# Patient Record
Sex: FEMALE | Race: WHITE | Employment: OTHER | URBAN - METROPOLITAN AREA
[De-identification: names, ages, dates, MRNs, and addresses within clinical notes are randomized per-mention and may not be internally consistent; named-entity substitution may affect disease eponyms.]

---

## 2020-02-25 ENCOUNTER — ANESTHESIA EVENT (OUTPATIENT)
Dept: OPERATING ROOM | Age: 55
DRG: 025 | End: 2020-02-25
Payer: COMMERCIAL

## 2020-02-25 RX ORDER — ONDANSETRON 4 MG/1
TABLET, FILM COATED ORAL
COMMUNITY
Start: 2020-02-21 | End: 2020-06-24 | Stop reason: ALTCHOICE

## 2020-02-25 RX ORDER — LEVETIRACETAM 500 MG/1
1000 TABLET ORAL 2 TIMES DAILY
COMMUNITY
Start: 2020-02-21

## 2020-02-25 RX ORDER — VENLAFAXINE 75 MG/1
75 TABLET ORAL DAILY
COMMUNITY
Start: 2017-07-21 | End: 2020-06-24 | Stop reason: DRUGHIGH

## 2020-02-25 RX ORDER — BENAZEPRIL/HYDROCHLOROTHIAZIDE 20 MG-25MG
1 TABLET ORAL DAILY
COMMUNITY
Start: 2017-06-12 | End: 2021-07-27

## 2020-02-25 RX ORDER — HYDROCODONE BITARTRATE AND ACETAMINOPHEN 5; 325 MG/1; MG/1
TABLET ORAL
Status: ON HOLD | COMMUNITY
Start: 2020-02-21 | End: 2020-02-28 | Stop reason: HOSPADM

## 2020-02-25 NOTE — PROGRESS NOTES
[facility] PRE-SURGICAL TESTING INSTRUCTIONS                              PRIOR TO PROCEDURE DATE:  1. Please follow any guidelines/instructions prior to your procedure as advised by your surgeon. 2. Arrange for someone to drive you home and be with you for the first 24 hours after discharge for your safety after your procedure for which you received sedation. Ensure it is someone we can share information with regarding your discharge. 3. You must contact your surgeon for instructions IF:   You are taking any blood thinners, aspirin, anti-inflammatory or vitamin E.   There is a change in your physical condition such as a cold, fever, rash, cuts, sores or any other infection, especially near your surgical site. 4. Do not drink alcohol the day before or day of your procedure. 5. A Pre-op History and Physical for surgery MUST be completed by your Physician or Urgent Care within 30 days of your procedure date. Please bring a copy with you on the day of your procedure and along with any other testing performed. THE DAY OF YOUR PROCEDURE:  1. Follow instructions for ARRIVAL TIME as DIRECTED BY YOUR SURGEON. I    2. Enter the MAIN entrance from [place] and follow the signs to the free [employer] or [email or web address] parking (offered free of charge 6am-5pm). 3. Enter the Main Entrance of the hospital (do not enter from the lower level of the parking garage). Upon entrance, check in with the  at the main desk on your left. If no one is available at the desk, proceed into the [facility] Waiting Room and go through the door directly into the [facility]. There is a Check-in desk ACROSS from Room 5 (marked with a sign hanging from the ceiling). The phone number for the surgery center is [phone]. 4. Please call [phone] option #2 option #2 if you have not been preregistered yet. On the day of your procedure bring your insurance card and photo ID.  You will be room.    13. If you have a Living Will or Durable Power of , please bring a copy on the day of your procedure. 15. With your permission, one family member may accompany you while you are being prepared for surgery. Once you are ready, additional family members may join you. HOW WE KEEP YOU SAFE and WORK TO PREVENT SURGICAL SITE INFECTIONS:  1. Health care workers should always check your ID bracelet to verify your name and birth date. You will be asked many times to state your name, date of birth, and allergies. 2. Health care workers should always clean their hands with soap or alcohol gel before providing care to you. It is okay to ask anyone if they cleaned their hands before they touch you. 3. You will be actively involved in verifying the type of procedure you are having and ensuring the correct surgical site. This will be confirmed multiple times prior to your procedure. Do NOT yann your surgery site UNLESS instructed to by your surgeon. 4. Do not shave or wax for 72 hours prior to procedure near your operative site. Shaving with a razor can irritate your skin and make it easier to develop an infection. On the day of your procedure, any hair that needs to be removed near the surgical site will be clipped by a healthcare worker using a special clippers designed to avoid skin irritation. 5. When you are in the operating room, your surgical site will be cleansed with a special soap, and in most cases, you will be given an antibiotic before the surgery begins. What to expect AFTER YOUR PROCEDURE:  1. Immediately following your procedure, your will be taken to the PACU for the first phase of your recovery. Your nurse will help you recover from any potential side effects of anesthesia, such as extreme drowsiness, changes in your vital signs or breathing patterns. Nausea, headache, muscle aches, or sore throat may also occur after anesthesia.   Your nurse will help you manage these potential side effects. 2. For comfort and safety, arrange to have someone at home with you for the first 24 hours after discharge. 3. You and your family will be given written instructions about your diet, activity, dressing care, medications, and return visits. 4. Once at home, should issues with nausea, pain, or bleeding occur, or should you notice any signs of infection, you should call your surgeon. 5. Always clean your hands before and after caring for your wound. Do not let your family touch your surgery site without cleaning their hands. 6. Narcotic pain medications can cause significant constipation. You may want to add a stool softener to your postoperative medication schedule or speak to your surgeon on how best to manage this SIDE EFFECT. SPECIAL INSTRUCTIONS     Thank you for allowing us to care for you. We strive to exceed your expectations in the delivery of care and service provided to you and your family. If you need to contact us for any reason, please call us at [phone]    Instructions reviewed with patient during preadmission testing phone interview. Beth Graham. 2/25/2020 .9:35 AM      ADDITIONAL EDUCATIONAL INFORMATION REVIEWED PER PHONE WITH YOU AND/OR YOUR FAMILY:  Yes Bring a urine sample on day of surgery  Yes Pain Goal-Taking Control of Your Pain  Yes FAQs about Surgical Site Infections  No Hibiclens® Bathing Instructions   Yes Antibacterial Soap  No Henok® Wipes Bathing Instructions (Obtained from: https://www.[employer]/. pdf )  No Incentive Spirometer Education  No Other

## 2020-02-25 NOTE — PROGRESS NOTES
hospitalization, the order may or may not be in effect during this procedure. I give my doctor permission to give me blood or blood products. I understand that there are risks with receiving blood such as hepatitis, AIDS, fever, or allergic reaction. I acknowledge that the risks, benefits, and alternatives of this treatment have been explained to me and that no express or implied warranty has been given by the hospital, any blood bank, or any person or entity as to the blood or blood components transfused. At the discretion of my doctor, I agree to allow observers, equipment/product representatives and allow photographing, and/or televising of the procedure, provided my name or identity is maintained confidentially. I agree the hospital may dispose of or use for scientific or educational purposes any tissue, fluid, or body parts which may be removed.     ________________________________Date________Time______ am/pm  (Tyler One)  Patient or Signature of Closest Relative or Legal Guardian    ________________________________Date________Time______am/pm      Page 1 of  1  Witness

## 2020-02-26 ENCOUNTER — APPOINTMENT (OUTPATIENT)
Dept: CT IMAGING | Age: 55
DRG: 025 | End: 2020-02-26
Attending: NEUROLOGICAL SURGERY
Payer: COMMERCIAL

## 2020-02-26 ENCOUNTER — ANESTHESIA (OUTPATIENT)
Dept: OPERATING ROOM | Age: 55
DRG: 025 | End: 2020-02-26
Payer: COMMERCIAL

## 2020-02-26 ENCOUNTER — HOSPITAL ENCOUNTER (INPATIENT)
Age: 55
LOS: 2 days | Discharge: HOME HEALTH | DRG: 025 | End: 2020-02-28
Attending: NEUROLOGICAL SURGERY | Admitting: NEUROLOGICAL SURGERY
Payer: COMMERCIAL

## 2020-02-26 ENCOUNTER — HOSPITAL ENCOUNTER (OUTPATIENT)
Dept: MRI IMAGING | Age: 55
Discharge: HOME | DRG: 025 | End: 2020-02-26
Payer: COMMERCIAL

## 2020-02-26 VITALS — DIASTOLIC BLOOD PRESSURE: 56 MMHG | OXYGEN SATURATION: 88 % | SYSTOLIC BLOOD PRESSURE: 112 MMHG | TEMPERATURE: 98.2 F

## 2020-02-26 PROBLEM — G93.89 BRAIN MASS: Status: ACTIVE | Noted: 2020-02-26

## 2020-02-26 LAB
ABO/RH: NORMAL
ANTIBODY SCREEN: NORMAL
GLUCOSE BLD-MCNC: 107 MG/DL (ref 70–99)
PERFORMED ON: ABNORMAL
PREGNANCY, URINE: NEGATIVE

## 2020-02-26 PROCEDURE — 88341 IMHCHEM/IMCYTCHM EA ADD ANTB: CPT

## 2020-02-26 PROCEDURE — 2580000003 HC RX 258: Performed by: ANESTHESIOLOGY

## 2020-02-26 PROCEDURE — A9579 GAD-BASE MR CONTRAST NOS,1ML: HCPCS | Performed by: NEUROLOGICAL SURGERY

## 2020-02-26 PROCEDURE — 6360000002 HC RX W HCPCS: Performed by: NURSE ANESTHETIST, CERTIFIED REGISTERED

## 2020-02-26 PROCEDURE — 2580000003 HC RX 258: Performed by: PHYSICIAN ASSISTANT

## 2020-02-26 PROCEDURE — 3700000000 HC ANESTHESIA ATTENDED CARE: Performed by: NEUROLOGICAL SURGERY

## 2020-02-26 PROCEDURE — 94150 VITAL CAPACITY TEST: CPT

## 2020-02-26 PROCEDURE — 2500000003 HC RX 250 WO HCPCS: Performed by: NURSE ANESTHETIST, CERTIFIED REGISTERED

## 2020-02-26 PROCEDURE — 86900 BLOOD TYPING SEROLOGIC ABO: CPT

## 2020-02-26 PROCEDURE — 6360000002 HC RX W HCPCS: Performed by: ANESTHESIOLOGY

## 2020-02-26 PROCEDURE — 2720000010 HC SURG SUPPLY STERILE: Performed by: NEUROLOGICAL SURGERY

## 2020-02-26 PROCEDURE — 3600000014 HC SURGERY LEVEL 4 ADDTL 15MIN: Performed by: NEUROLOGICAL SURGERY

## 2020-02-26 PROCEDURE — 88331 PATH CONSLTJ SURG 1 BLK 1SPC: CPT

## 2020-02-26 PROCEDURE — 2580000003 HC RX 258: Performed by: NURSE ANESTHETIST, CERTIFIED REGISTERED

## 2020-02-26 PROCEDURE — 88307 TISSUE EXAM BY PATHOLOGIST: CPT

## 2020-02-26 PROCEDURE — 6370000000 HC RX 637 (ALT 250 FOR IP): Performed by: ANESTHESIOLOGY

## 2020-02-26 PROCEDURE — 86850 RBC ANTIBODY SCREEN: CPT

## 2020-02-26 PROCEDURE — 7100000001 HC PACU RECOVERY - ADDTL 15 MIN: Performed by: NEUROLOGICAL SURGERY

## 2020-02-26 PROCEDURE — 84703 CHORIONIC GONADOTROPIN ASSAY: CPT

## 2020-02-26 PROCEDURE — 00B00ZX EXCISION OF BRAIN, OPEN APPROACH, DIAGNOSTIC: ICD-10-PCS | Performed by: NEUROLOGICAL SURGERY

## 2020-02-26 PROCEDURE — 2500000003 HC RX 250 WO HCPCS: Performed by: ANESTHESIOLOGY

## 2020-02-26 PROCEDURE — 6370000000 HC RX 637 (ALT 250 FOR IP): Performed by: PHYSICIAN ASSISTANT

## 2020-02-26 PROCEDURE — 2709999900 HC NON-CHARGEABLE SUPPLY: Performed by: NEUROLOGICAL SURGERY

## 2020-02-26 PROCEDURE — 6360000004 HC RX CONTRAST MEDICATION: Performed by: NEUROLOGICAL SURGERY

## 2020-02-26 PROCEDURE — 1200000000 HC SEMI PRIVATE

## 2020-02-26 PROCEDURE — 70552 MRI BRAIN STEM W/DYE: CPT

## 2020-02-26 PROCEDURE — 70450 CT HEAD/BRAIN W/O DYE: CPT

## 2020-02-26 PROCEDURE — 2500000003 HC RX 250 WO HCPCS: Performed by: NEUROLOGICAL SURGERY

## 2020-02-26 PROCEDURE — 86901 BLOOD TYPING SEROLOGIC RH(D): CPT

## 2020-02-26 PROCEDURE — 6370000000 HC RX 637 (ALT 250 FOR IP): Performed by: NEUROLOGICAL SURGERY

## 2020-02-26 PROCEDURE — 3600000004 HC SURGERY LEVEL 4 BASE: Performed by: NEUROLOGICAL SURGERY

## 2020-02-26 PROCEDURE — 3700000001 HC ADD 15 MINUTES (ANESTHESIA): Performed by: NEUROLOGICAL SURGERY

## 2020-02-26 PROCEDURE — 88360 TUMOR IMMUNOHISTOCHEM/MANUAL: CPT

## 2020-02-26 PROCEDURE — 6360000002 HC RX W HCPCS: Performed by: PHYSICIAN ASSISTANT

## 2020-02-26 PROCEDURE — 7100000000 HC PACU RECOVERY - FIRST 15 MIN: Performed by: NEUROLOGICAL SURGERY

## 2020-02-26 PROCEDURE — 6370000000 HC RX 637 (ALT 250 FOR IP)

## 2020-02-26 PROCEDURE — 88342 IMHCHEM/IMCYTCHM 1ST ANTB: CPT

## 2020-02-26 PROCEDURE — 6360000002 HC RX W HCPCS: Performed by: NEUROLOGICAL SURGERY

## 2020-02-26 RX ORDER — NALOXONE HYDROCHLORIDE 0.4 MG/ML
0.2 INJECTION, SOLUTION INTRAMUSCULAR; INTRAVENOUS; SUBCUTANEOUS PRN
Status: DISCONTINUED | OUTPATIENT
Start: 2020-02-26 | End: 2020-02-28 | Stop reason: HOSPADM

## 2020-02-26 RX ORDER — SODIUM CHLORIDE 0.9 % (FLUSH) 0.9 %
10 SYRINGE (ML) INJECTION PRN
Status: DISCONTINUED | OUTPATIENT
Start: 2020-02-26 | End: 2020-02-26 | Stop reason: HOSPADM

## 2020-02-26 RX ORDER — ONDANSETRON 2 MG/ML
4 INJECTION INTRAMUSCULAR; INTRAVENOUS ONCE
Status: COMPLETED | OUTPATIENT
Start: 2020-02-26 | End: 2020-02-26

## 2020-02-26 RX ORDER — 0.9 % SODIUM CHLORIDE 0.9 %
500 INTRAVENOUS SOLUTION INTRAVENOUS
Status: DISCONTINUED | OUTPATIENT
Start: 2020-02-26 | End: 2020-02-26 | Stop reason: HOSPADM

## 2020-02-26 RX ORDER — METHOCARBAMOL 750 MG/1
1500 TABLET, FILM COATED ORAL EVERY 6 HOURS PRN
Status: DISCONTINUED | OUTPATIENT
Start: 2020-02-26 | End: 2020-02-28 | Stop reason: HOSPADM

## 2020-02-26 RX ORDER — LIDOCAINE HYDROCHLORIDE 20 MG/ML
INJECTION, SOLUTION INTRAVENOUS PRN
Status: DISCONTINUED | OUTPATIENT
Start: 2020-02-26 | End: 2020-02-26 | Stop reason: SDUPTHER

## 2020-02-26 RX ORDER — LIDOCAINE HYDROCHLORIDE 10 MG/ML
1 INJECTION, SOLUTION EPIDURAL; INFILTRATION; INTRACAUDAL; PERINEURAL
Status: DISCONTINUED | OUTPATIENT
Start: 2020-02-26 | End: 2020-02-26 | Stop reason: HOSPADM

## 2020-02-26 RX ORDER — BUPIVACAINE HYDROCHLORIDE AND EPINEPHRINE 5; 5 MG/ML; UG/ML
INJECTION, SOLUTION EPIDURAL; INTRACAUDAL; PERINEURAL PRN
Status: DISCONTINUED | OUTPATIENT
Start: 2020-02-26 | End: 2020-02-26 | Stop reason: HOSPADM

## 2020-02-26 RX ORDER — SODIUM CHLORIDE 0.9 % (FLUSH) 0.9 %
10 SYRINGE (ML) INJECTION PRN
Status: DISCONTINUED | OUTPATIENT
Start: 2020-02-26 | End: 2020-02-28 | Stop reason: HOSPADM

## 2020-02-26 RX ORDER — PROMETHAZINE HYDROCHLORIDE 25 MG/ML
6.25 INJECTION, SOLUTION INTRAMUSCULAR; INTRAVENOUS
Status: DISCONTINUED | OUTPATIENT
Start: 2020-02-26 | End: 2020-02-26 | Stop reason: HOSPADM

## 2020-02-26 RX ORDER — HEPARIN SODIUM 5000 [USP'U]/ML
5000 INJECTION, SOLUTION INTRAVENOUS; SUBCUTANEOUS EVERY 12 HOURS
Status: DISCONTINUED | OUTPATIENT
Start: 2020-02-27 | End: 2020-02-27

## 2020-02-26 RX ORDER — SODIUM CHLORIDE, SODIUM LACTATE, POTASSIUM CHLORIDE, CALCIUM CHLORIDE 600; 310; 30; 20 MG/100ML; MG/100ML; MG/100ML; MG/100ML
INJECTION, SOLUTION INTRAVENOUS CONTINUOUS PRN
Status: DISCONTINUED | OUTPATIENT
Start: 2020-02-26 | End: 2020-02-26 | Stop reason: SDUPTHER

## 2020-02-26 RX ORDER — DEXAMETHASONE SODIUM PHOSPHATE 4 MG/ML
INJECTION, SOLUTION INTRA-ARTICULAR; INTRALESIONAL; INTRAMUSCULAR; INTRAVENOUS; SOFT TISSUE PRN
Status: DISCONTINUED | OUTPATIENT
Start: 2020-02-26 | End: 2020-02-26 | Stop reason: SDUPTHER

## 2020-02-26 RX ORDER — CYCLOBENZAPRINE HCL 10 MG
10 TABLET ORAL 3 TIMES DAILY PRN
Status: DISCONTINUED | OUTPATIENT
Start: 2020-02-26 | End: 2020-02-28 | Stop reason: HOSPADM

## 2020-02-26 RX ORDER — PROPOFOL 10 MG/ML
INJECTION, EMULSION INTRAVENOUS PRN
Status: DISCONTINUED | OUTPATIENT
Start: 2020-02-26 | End: 2020-02-26 | Stop reason: SDUPTHER

## 2020-02-26 RX ORDER — ROCURONIUM BROMIDE 10 MG/ML
INJECTION, SOLUTION INTRAVENOUS PRN
Status: DISCONTINUED | OUTPATIENT
Start: 2020-02-26 | End: 2020-02-26 | Stop reason: SDUPTHER

## 2020-02-26 RX ORDER — LISINOPRIL 20 MG/1
20 TABLET ORAL DAILY
Status: DISCONTINUED | OUTPATIENT
Start: 2020-02-26 | End: 2020-02-28 | Stop reason: HOSPADM

## 2020-02-26 RX ORDER — SODIUM CHLORIDE 0.9 % (FLUSH) 0.9 %
10 SYRINGE (ML) INJECTION EVERY 12 HOURS SCHEDULED
Status: DISCONTINUED | OUTPATIENT
Start: 2020-02-26 | End: 2020-02-28 | Stop reason: HOSPADM

## 2020-02-26 RX ORDER — OXYCODONE HYDROCHLORIDE 5 MG/1
5 TABLET ORAL EVERY 4 HOURS PRN
Status: DISCONTINUED | OUTPATIENT
Start: 2020-02-26 | End: 2020-02-28 | Stop reason: HOSPADM

## 2020-02-26 RX ORDER — SODIUM CHLORIDE 9 MG/ML
INJECTION, SOLUTION INTRAVENOUS CONTINUOUS
Status: DISCONTINUED | OUTPATIENT
Start: 2020-02-26 | End: 2020-02-27

## 2020-02-26 RX ORDER — SODIUM CHLORIDE, SODIUM LACTATE, POTASSIUM CHLORIDE, CALCIUM CHLORIDE 600; 310; 30; 20 MG/100ML; MG/100ML; MG/100ML; MG/100ML
INJECTION, SOLUTION INTRAVENOUS CONTINUOUS
Status: DISCONTINUED | OUTPATIENT
Start: 2020-02-26 | End: 2020-02-26

## 2020-02-26 RX ORDER — PROMETHAZINE HYDROCHLORIDE 12.5 MG/1
12.5 TABLET ORAL EVERY 6 HOURS PRN
Status: DISCONTINUED | OUTPATIENT
Start: 2020-02-26 | End: 2020-02-28 | Stop reason: HOSPADM

## 2020-02-26 RX ORDER — SODIUM CHLORIDE 9 MG/ML
INJECTION, SOLUTION INTRAVENOUS CONTINUOUS PRN
Status: DISCONTINUED | OUTPATIENT
Start: 2020-02-26 | End: 2020-02-26 | Stop reason: SDUPTHER

## 2020-02-26 RX ORDER — SODIUM CHLORIDE, SODIUM LACTATE, POTASSIUM CHLORIDE, CALCIUM CHLORIDE 600; 310; 30; 20 MG/100ML; MG/100ML; MG/100ML; MG/100ML
INJECTION, SOLUTION INTRAVENOUS CONTINUOUS
Status: DISCONTINUED | OUTPATIENT
Start: 2020-02-26 | End: 2020-02-27

## 2020-02-26 RX ORDER — FENTANYL CITRATE 50 UG/ML
25 INJECTION, SOLUTION INTRAMUSCULAR; INTRAVENOUS EVERY 5 MIN PRN
Status: DISCONTINUED | OUTPATIENT
Start: 2020-02-26 | End: 2020-02-26 | Stop reason: HOSPADM

## 2020-02-26 RX ORDER — SODIUM CHLORIDE 0.9 % (FLUSH) 0.9 %
10 SYRINGE (ML) INJECTION EVERY 12 HOURS SCHEDULED
Status: DISCONTINUED | OUTPATIENT
Start: 2020-02-26 | End: 2020-02-26 | Stop reason: HOSPADM

## 2020-02-26 RX ORDER — PROPOFOL 10 MG/ML
INJECTION, EMULSION INTRAVENOUS CONTINUOUS PRN
Status: DISCONTINUED | OUTPATIENT
Start: 2020-02-26 | End: 2020-02-26 | Stop reason: SDUPTHER

## 2020-02-26 RX ORDER — ONDANSETRON 2 MG/ML
4 INJECTION INTRAMUSCULAR; INTRAVENOUS EVERY 6 HOURS PRN
Status: DISCONTINUED | OUTPATIENT
Start: 2020-02-26 | End: 2020-02-28 | Stop reason: HOSPADM

## 2020-02-26 RX ORDER — MORPHINE SULFATE 2 MG/ML
2 INJECTION, SOLUTION INTRAMUSCULAR; INTRAVENOUS
Status: DISCONTINUED | OUTPATIENT
Start: 2020-02-26 | End: 2020-02-28 | Stop reason: HOSPADM

## 2020-02-26 RX ORDER — ONDANSETRON 2 MG/ML
INJECTION INTRAMUSCULAR; INTRAVENOUS PRN
Status: DISCONTINUED | OUTPATIENT
Start: 2020-02-26 | End: 2020-02-26 | Stop reason: SDUPTHER

## 2020-02-26 RX ORDER — FENTANYL CITRATE 50 UG/ML
INJECTION, SOLUTION INTRAMUSCULAR; INTRAVENOUS PRN
Status: DISCONTINUED | OUTPATIENT
Start: 2020-02-26 | End: 2020-02-26 | Stop reason: SDUPTHER

## 2020-02-26 RX ORDER — VENLAFAXINE 75 MG/1
75 TABLET ORAL 2 TIMES DAILY WITH MEALS
Status: DISCONTINUED | OUTPATIENT
Start: 2020-02-26 | End: 2020-02-28 | Stop reason: HOSPADM

## 2020-02-26 RX ORDER — HYDROMORPHONE HCL 110MG/55ML
PATIENT CONTROLLED ANALGESIA SYRINGE INTRAVENOUS PRN
Status: DISCONTINUED | OUTPATIENT
Start: 2020-02-26 | End: 2020-02-26 | Stop reason: SDUPTHER

## 2020-02-26 RX ORDER — ONDANSETRON 2 MG/ML
4 INJECTION INTRAMUSCULAR; INTRAVENOUS
Status: DISCONTINUED | OUTPATIENT
Start: 2020-02-26 | End: 2020-02-26 | Stop reason: HOSPADM

## 2020-02-26 RX ORDER — LEVETIRACETAM 500 MG/1
500 TABLET ORAL 2 TIMES DAILY
Status: DISCONTINUED | OUTPATIENT
Start: 2020-02-26 | End: 2020-02-28 | Stop reason: HOSPADM

## 2020-02-26 RX ORDER — HYDROCHLOROTHIAZIDE 25 MG/1
25 TABLET ORAL DAILY
Status: DISCONTINUED | OUTPATIENT
Start: 2020-02-26 | End: 2020-02-28 | Stop reason: HOSPADM

## 2020-02-26 RX ORDER — ONDANSETRON 4 MG/1
4 TABLET, FILM COATED ORAL EVERY 8 HOURS PRN
Status: DISCONTINUED | OUTPATIENT
Start: 2020-02-26 | End: 2020-02-28 | Stop reason: HOSPADM

## 2020-02-26 RX ORDER — MORPHINE SULFATE 2 MG/ML
4 INJECTION, SOLUTION INTRAMUSCULAR; INTRAVENOUS
Status: DISCONTINUED | OUTPATIENT
Start: 2020-02-26 | End: 2020-02-28 | Stop reason: HOSPADM

## 2020-02-26 RX ORDER — BENAZEPRIL/HYDROCHLOROTHIAZIDE 20 MG-25MG
1 TABLET ORAL DAILY
Status: DISCONTINUED | OUTPATIENT
Start: 2020-02-26 | End: 2020-02-26 | Stop reason: CLARIF

## 2020-02-26 RX ORDER — HYDROCODONE BITARTRATE AND ACETAMINOPHEN 5; 325 MG/1; MG/1
1 TABLET ORAL ONCE
Status: COMPLETED | OUTPATIENT
Start: 2020-02-26 | End: 2020-02-26

## 2020-02-26 RX ORDER — SUCCINYLCHOLINE/SOD CL,ISO/PF 200MG/10ML
SYRINGE (ML) INTRAVENOUS PRN
Status: DISCONTINUED | OUTPATIENT
Start: 2020-02-26 | End: 2020-02-26 | Stop reason: SDUPTHER

## 2020-02-26 RX ADMIN — FENTANYL CITRATE 100 MCG: 50 INJECTION INTRAMUSCULAR; INTRAVENOUS at 13:02

## 2020-02-26 RX ADMIN — LISINOPRIL 20 MG: 20 TABLET ORAL at 18:28

## 2020-02-26 RX ADMIN — DEXAMETHASONE SODIUM PHOSPHATE 10 MG: 4 INJECTION, SOLUTION INTRAMUSCULAR; INTRAVENOUS at 13:02

## 2020-02-26 RX ADMIN — CEFAZOLIN 2 G: 10 INJECTION, POWDER, FOR SOLUTION INTRAVENOUS at 13:30

## 2020-02-26 RX ADMIN — HYDROCODONE BITARTRATE AND ACETAMINOPHEN 1 TABLET: 5; 325 TABLET ORAL at 10:09

## 2020-02-26 RX ADMIN — HYDROMORPHONE HYDROCHLORIDE 0.4 MG: 2 INJECTION, SOLUTION INTRAMUSCULAR; INTRAVENOUS; SUBCUTANEOUS at 13:09

## 2020-02-26 RX ADMIN — SODIUM CHLORIDE: 9 INJECTION, SOLUTION INTRAVENOUS at 13:56

## 2020-02-26 RX ADMIN — HYDROMORPHONE HYDROCHLORIDE 0.6 MG: 2 INJECTION, SOLUTION INTRAMUSCULAR; INTRAVENOUS; SUBCUTANEOUS at 13:46

## 2020-02-26 RX ADMIN — PROPOFOL: 10 INJECTION, EMULSION INTRAVENOUS at 14:10

## 2020-02-26 RX ADMIN — HYDROCHLOROTHIAZIDE 25 MG: 25 TABLET ORAL at 18:28

## 2020-02-26 RX ADMIN — REMIFENTANIL HYDROCHLORIDE 0.1 MCG/KG/MIN: 1 INJECTION, POWDER, LYOPHILIZED, FOR SOLUTION INTRAVENOUS at 13:07

## 2020-02-26 RX ADMIN — HYDROMORPHONE HYDROCHLORIDE 0.2 MG: 2 INJECTION, SOLUTION INTRAMUSCULAR; INTRAVENOUS; SUBCUTANEOUS at 15:08

## 2020-02-26 RX ADMIN — VENLAFAXINE 75 MG: 75 TABLET ORAL at 18:29

## 2020-02-26 RX ADMIN — DEXMEDETOMIDINE HYDROCHLORIDE 12 MCG: 100 INJECTION, SOLUTION INTRAVENOUS at 15:03

## 2020-02-26 RX ADMIN — LEVETIRACETAM 500 MG: 500 TABLET ORAL at 21:11

## 2020-02-26 RX ADMIN — GADOTERIDOL 20 ML: 279.3 INJECTION, SOLUTION INTRAVENOUS at 08:45

## 2020-02-26 RX ADMIN — DEXMEDETOMIDINE HYDROCHLORIDE 8 MCG: 100 INJECTION, SOLUTION INTRAVENOUS at 13:02

## 2020-02-26 RX ADMIN — PROPOFOL 125 MCG/KG/MIN: 10 INJECTION, EMULSION INTRAVENOUS at 13:07

## 2020-02-26 RX ADMIN — PHENYLEPHRINE HYDROCHLORIDE 10 MCG/MIN: 10 INJECTION, SOLUTION INTRAMUSCULAR; INTRAVENOUS; SUBCUTANEOUS at 13:43

## 2020-02-26 RX ADMIN — SODIUM CHLORIDE, SODIUM LACTATE, POTASSIUM CHLORIDE, AND CALCIUM CHLORIDE: 600; 310; 30; 20 INJECTION, SOLUTION INTRAVENOUS at 08:20

## 2020-02-26 RX ADMIN — FENTANYL CITRATE 100 MCG: 50 INJECTION INTRAMUSCULAR; INTRAVENOUS at 13:09

## 2020-02-26 RX ADMIN — SODIUM CHLORIDE: 9 INJECTION, SOLUTION INTRAVENOUS at 16:42

## 2020-02-26 RX ADMIN — ONDANSETRON 4 MG: 2 INJECTION INTRAMUSCULAR; INTRAVENOUS at 13:02

## 2020-02-26 RX ADMIN — LIDOCAINE HYDROCHLORIDE 60 MG: 20 INJECTION, SOLUTION INTRAVENOUS at 13:02

## 2020-02-26 RX ADMIN — DEXMEDETOMIDINE HYDROCHLORIDE 8 MCG: 100 INJECTION, SOLUTION INTRAVENOUS at 12:56

## 2020-02-26 RX ADMIN — ONDANSETRON 4 MG: 2 INJECTION INTRAMUSCULAR; INTRAVENOUS at 08:27

## 2020-02-26 RX ADMIN — SODIUM CHLORIDE, POTASSIUM CHLORIDE, SODIUM LACTATE AND CALCIUM CHLORIDE: 600; 310; 30; 20 INJECTION, SOLUTION INTRAVENOUS at 10:13

## 2020-02-26 RX ADMIN — PROPOFOL 200 MG: 10 INJECTION, EMULSION INTRAVENOUS at 13:02

## 2020-02-26 RX ADMIN — PHENYLEPHRINE HYDROCHLORIDE 80 MCG: 10 INJECTION, SOLUTION INTRAMUSCULAR; INTRAVENOUS; SUBCUTANEOUS at 13:53

## 2020-02-26 RX ADMIN — DEXMEDETOMIDINE HYDROCHLORIDE 8 MCG: 100 INJECTION, SOLUTION INTRAVENOUS at 15:02

## 2020-02-26 RX ADMIN — ROCURONIUM BROMIDE 5 MG: 10 INJECTION, SOLUTION INTRAVENOUS at 13:02

## 2020-02-26 RX ADMIN — SODIUM CHLORIDE, SODIUM LACTATE, POTASSIUM CHLORIDE, AND CALCIUM CHLORIDE: 600; 310; 30; 20 INJECTION, SOLUTION INTRAVENOUS at 12:56

## 2020-02-26 RX ADMIN — ONDANSETRON 4 MG: 2 INJECTION INTRAMUSCULAR; INTRAVENOUS at 15:03

## 2020-02-26 RX ADMIN — DEXTROSE MONOHYDRATE 2 G: 50 INJECTION, SOLUTION INTRAVENOUS at 21:11

## 2020-02-26 RX ADMIN — PROPOFOL 100 MG: 10 INJECTION, EMULSION INTRAVENOUS at 13:10

## 2020-02-26 RX ADMIN — Medication 160 MG: at 13:02

## 2020-02-26 ASSESSMENT — PULMONARY FUNCTION TESTS
PIF_VALUE: 23
PIF_VALUE: 24
PIF_VALUE: 23
PIF_VALUE: 23
PIF_VALUE: 26
PIF_VALUE: 23
PIF_VALUE: 6
PIF_VALUE: 23
PIF_VALUE: 24
PIF_VALUE: 22
PIF_VALUE: 2
PIF_VALUE: 23
PIF_VALUE: 24
PIF_VALUE: 0
PIF_VALUE: 4
PIF_VALUE: 23
PIF_VALUE: 24
PIF_VALUE: 24
PIF_VALUE: 23
PIF_VALUE: 24
PIF_VALUE: 25
PIF_VALUE: 23
PIF_VALUE: 24
PIF_VALUE: 23
PIF_VALUE: 23
PIF_VALUE: 24
PIF_VALUE: 29
PIF_VALUE: 23
PIF_VALUE: 23
PIF_VALUE: 24
PIF_VALUE: 7
PIF_VALUE: 26
PIF_VALUE: 23
PIF_VALUE: 23
PIF_VALUE: 22
PIF_VALUE: 24
PIF_VALUE: 0
PIF_VALUE: 24
PIF_VALUE: 0
PIF_VALUE: 23
PIF_VALUE: 23
PIF_VALUE: 24
PIF_VALUE: 23
PIF_VALUE: 24
PIF_VALUE: 23
PIF_VALUE: 2
PIF_VALUE: 0
PIF_VALUE: 23
PIF_VALUE: 24
PIF_VALUE: 23
PIF_VALUE: 23
PIF_VALUE: 24
PIF_VALUE: 0
PIF_VALUE: 1
PIF_VALUE: 23
PIF_VALUE: 24
PIF_VALUE: 23
PIF_VALUE: 23
PIF_VALUE: 1
PIF_VALUE: 24
PIF_VALUE: 23
PIF_VALUE: 27
PIF_VALUE: 24
PIF_VALUE: 23
PIF_VALUE: 24
PIF_VALUE: 23
PIF_VALUE: 24
PIF_VALUE: 23
PIF_VALUE: 24
PIF_VALUE: 1
PIF_VALUE: 23
PIF_VALUE: 2
PIF_VALUE: 23
PIF_VALUE: 25
PIF_VALUE: 23
PIF_VALUE: 22
PIF_VALUE: 19
PIF_VALUE: 23
PIF_VALUE: 23
PIF_VALUE: 24
PIF_VALUE: 23
PIF_VALUE: 24
PIF_VALUE: 23
PIF_VALUE: 31
PIF_VALUE: 2
PIF_VALUE: 2
PIF_VALUE: 3
PIF_VALUE: 31
PIF_VALUE: 22
PIF_VALUE: 22
PIF_VALUE: 24
PIF_VALUE: 24
PIF_VALUE: 23
PIF_VALUE: 22
PIF_VALUE: 23
PIF_VALUE: 23
PIF_VALUE: 24
PIF_VALUE: 23
PIF_VALUE: 24
PIF_VALUE: 24
PIF_VALUE: 23
PIF_VALUE: 23
PIF_VALUE: 1
PIF_VALUE: 23
PIF_VALUE: 32
PIF_VALUE: 23
PIF_VALUE: 23
PIF_VALUE: 22
PIF_VALUE: 23
PIF_VALUE: 23
PIF_VALUE: 24
PIF_VALUE: 23
PIF_VALUE: 23
PIF_VALUE: 14
PIF_VALUE: 22
PIF_VALUE: 23
PIF_VALUE: 28
PIF_VALUE: 2
PIF_VALUE: 23
PIF_VALUE: 2
PIF_VALUE: 23
PIF_VALUE: 25
PIF_VALUE: 23
PIF_VALUE: 24
PIF_VALUE: 22
PIF_VALUE: 2

## 2020-02-26 ASSESSMENT — PAIN DESCRIPTION - PAIN TYPE: TYPE: CHRONIC PAIN

## 2020-02-26 ASSESSMENT — PAIN SCALES - GENERAL: PAINLEVEL_OUTOF10: 3

## 2020-02-26 ASSESSMENT — PAIN DESCRIPTION - ORIENTATION: ORIENTATION: UPPER

## 2020-02-26 ASSESSMENT — PAIN DESCRIPTION - LOCATION: LOCATION: HEAD

## 2020-02-26 ASSESSMENT — PAIN DESCRIPTION - DESCRIPTORS
DESCRIPTORS: THROBBING
DESCRIPTORS: THROBBING

## 2020-02-26 ASSESSMENT — PAIN - FUNCTIONAL ASSESSMENT: PAIN_FUNCTIONAL_ASSESSMENT: 0-10

## 2020-02-26 NOTE — PROGRESS NOTES
Patient admitted to PACU # 13 from OR at [address]  per Dr. Tiburcio Brown. Attached to PACU monitoring system and report received from anesthesia provider. Patient was reported to be hemodynamically stable during procedure. Patient sleepy from anesthesia requiring an oral airway to remain in place. Airway removed now and O2 per simple mask. Still sleepy from anesthesia. Continue to monitor.

## 2020-02-26 NOTE — ANESTHESIA PRE PROCEDURE
Nursing notes reviewed  Airway: Mallampati: II  TM distance: >3 FB   Neck ROM: full  Mouth opening: > = 3 FB Dental: normal exam         Pulmonary:Negative Pulmonary ROS and normal exam  breath sounds clear to auscultation            Patient did not smoke on day of surgery. Cardiovascular:Negative CV ROS  Exercise tolerance: good (>4 METS),   (+) hypertension: mild,         Rhythm: regular  Rate: normal           Beta Blocker:  Not on Beta Blocker         Neuro/Psych:   Negative Neuro/Psych ROS              GI/Hepatic/Renal: Neg GI/Hepatic/Renal ROS            Endo/Other: Negative Endo/Other ROS                    Abdominal:       Abdomen: soft. Vascular: negative vascular ROS. Anesthesia Plan      general     ASA 3       Induction: intravenous. MIPS: Postoperative opioids intended and Prophylactic antiemetics administered. Anesthetic plan and risks discussed with patient. Use of blood products discussed with patient whom consented to blood products. Plan discussed with attending and CRNA.     Attending anesthesiologist reviewed and agrees with Pre Eval content              Imelda Storm DO   2/26/2020

## 2020-02-26 NOTE — PROGRESS NOTES
Spoke with DR Kristofer Vega concerning ongoing pain and pressure does not want to medicate any further at present Pt.  And  aware  Using ice on and off  Lights out door closed

## 2020-02-26 NOTE — PROGRESS NOTES
NEUROSURGERY HANDOFF    Patient Information  Name:PEDRO MORENO NUO:[id number]   :1965 Code Status:No Order   No Known Allergies Extended Emergency Contact Information  Primary Emergency Contact: emerson moreno  Home Phone: [phone]  Relation: Spouse  Secondary Emergency Contact: jamil alvarez  Home Phone: [phone]  Relation: Brother/Sister     Admission Information  Date of Admission:2020  7:41 AM Location:OR/NONE   Admission Diagnosis:Neoplasm, brain ([facility].) [D49.6] Attending Physician:Kai Brown MD   Procedure(s) (LRB):  RIGHT PARIETAL CRANIOTOMY FOR STEREOTACTIC BIOPSY OF BRAIN MASS (Right) Neurosurgeon:Kai Brown MD     Patient Summary  Carmelo Schlatter is a 49 y.o. female patient with Neoplasm, brain ([facility] [D49.6] who under went a Procedure(s) (LRB):  RIGHT PARIETAL [place] (Right) today by Rushie People. Tiburcio Brown MD.    History of Present Illness:  Patient had pre-op complaints of short term memory loss, confusion, HA, nausea and dizziness was found to have a brain mass on workup that was unresponsive to conservative treatments. Vital Signs:  /62   Pulse 73   Temp 97.9 °F (36.6 °C) (Temporal)   Resp 13   Ht 5' 2\" (1.575 m)   Wt 234 lb (106.1 kg)   LMP 2019 (Within Months)   SpO2 92%   BMI 42.80 kg/m²     Situation Awareness  Contingency Planning  If the patient has:  · LOC  · Sudden change in neuro exam that includes:   · Numbness or tingling in extremities  · Weakness in extremities  · Incision begins to separate  · Copious amount of blood or fluid draining from the incision  · Signs of infection, such as:   · Temperature exceeds 101° F  · Increased pain, swelling, warmth, or redness around incision site  · Red streaks leading from the site  · Pus draining from the site    You MUST contact [facility]ie People.  Tiburcio Brown MD at [phone] or by using [employer]    You can also contact the [facility] [email or web address] Neurosurgery NP Monday-Friday 7am-5pm @ [phone]    Electronically signed by JEN Mercado CNP on 2/26/2020 at 4:48 PM

## 2020-02-26 NOTE — OP NOTE
Operative Report    PATIENT NAME:  Dakota Mcneill  YOB: 1965  MEDICAL RECORD#  [id number]  SURGERY DATE:  2/26/2020  SURGEON:   Mitchell Taylor MD, PhD  ASSISTANT:   Justin Enciso PA-C. She served as assistant on this surgery due to the complex nature of the surgery and the lack of a resident or fellow assistant. She provided assistance with critical tissue retraction at parts of the surgery, wound closure and assistance with protecting critical neuro elements. DICTATED BY:  Mitchell Taylor MD, PhD           PREOPERATIVE DIAGNOSIS(ES):       1. 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 Same, frozen pathology consistent with glioma       PROCEDURE(S) PERFORMED:      1. Right parietal neptali hole craniectomy  2. Image guided stereotactic biopsy of right parietal lesion      ANESTHESIA:  General.      INDICATIONS FOR SURGERY:   Dakota Mcneill who was recently diagnosed with a large, heterogeneously enhancing mass lesion extending across the splenium of the corpus collosum. There is significant associate vasogenic edema. She has been experiencing memory loss and cognitive difficulties. The patient was made aware of the potential benefits and the possible risks including (but not limited to):  infection, bleeding, seizures, paralysis, weakness, numbness, intraparenchymal hemorrhage, wound healing problems, stroke, coma or even death. The patient understood that these risks and wished to proceed with stereotactic biopsy of the lesion. DETAILS OF PROCEDURE:  The patient was brought to the operating room, placed on the table in supine position and underwent endotracheal intubation and general anesthesia. A time out was performed. The head was fixed in the [facility] and the [facility] stereotactic imaging was registered successfully with good precision.   The incision was planned over the lesion utilizing navigation and injected with local anesthetic containing epinephrine. The skin was shaved, prepped and draped in the usual fashion. A #15 blade was used to incise the skin full thickness. The pericranium was incised with the Bovie and a bur hole was created with a [employer] drill. The dura was coagulated with the bipolar electrocautery, incised with a #15 blade and opened in a cruciate fashion. The surface of the cortex appeared normal. A corticectomy was performed using a bipolar, followed by microscissors. The Varioguide was brought into the field and registered. This was used to remove a sample of tissue and this was sent for frozen section. A second sample was then taken for permanent. The wound was then copiously irrigated and covered with a sterile towel until diagnosis was confirmed by frozen pathology. The needle was withdrawn. The wound again irrigated and meticulous hemostasis was ensured. A piece of gelfoam was placed over the incised dura and covered with a large titanium neptali hole covered affixed with #4 screws. The wound was irrigated copiously with antibiotic impregnated solution. The galea was closed with 2-0 Vicryl sutures. The skin was closed with a subcuticular 4-0 monocryl and dressed with Dermabond. The patient awoke in the operating room and was extubated. The patient was brought to the recovery room in good condition with stable vital signs. There were no complications. All needle, instrument, and sponge counts were correct. In accordance with CMS guidelines, I attest that I was present for the entire procedure from the creation of the skin incision to the closure. ESTIMATED BLOOD LOSS: minimal    COMPLICATIONS: No complications apparent. DISPOSITION: The patient was transferred to the PACU in stable condition, awake, alert, and moving all extremities on command.     Dictated by: Jeff Bishop MD

## 2020-02-27 PROBLEM — Z98.890 S/P CRANIOTOMY: Status: ACTIVE | Noted: 2020-02-27

## 2020-02-27 LAB
APTT: 29.4 SEC (ref 24.2–36.2)
BASOPHILS ABSOLUTE: 0.1 K/UL (ref 0–0.2)
BASOPHILS RELATIVE PERCENT: 0.7 %
EOSINOPHILS ABSOLUTE: 0 K/UL (ref 0–0.6)
EOSINOPHILS RELATIVE PERCENT: 0 %
HCT VFR BLD CALC: 42.8 % (ref 36–48)
HEMOGLOBIN: 14.2 G/DL (ref 12–16)
INR BLD: 1.18 (ref 0.86–1.14)
LYMPHOCYTES ABSOLUTE: 1.6 K/UL (ref 1–5.1)
LYMPHOCYTES RELATIVE PERCENT: 11.8 %
MCH RBC QN AUTO: 30.4 PG (ref 26–34)
MCHC RBC AUTO-ENTMCNC: 33.2 G/DL (ref 31–36)
MCV RBC AUTO: 91.6 FL (ref 80–100)
MONOCYTES ABSOLUTE: 0.8 K/UL (ref 0–1.3)
MONOCYTES RELATIVE PERCENT: 5.7 %
NEUTROPHILS ABSOLUTE: 10.9 K/UL (ref 1.7–7.7)
NEUTROPHILS RELATIVE PERCENT: 81.8 %
PDW BLD-RTO: 12.9 % (ref 12.4–15.4)
PLATELET # BLD: 278 K/UL (ref 135–450)
PMV BLD AUTO: 10.1 FL (ref 5–10.5)
PROTHROMBIN TIME: 13.7 SEC (ref 10–13.2)
RBC # BLD: 4.67 M/UL (ref 4–5.2)
WBC # BLD: 13.3 K/UL (ref 4–11)

## 2020-02-27 PROCEDURE — 36415 COLL VENOUS BLD VENIPUNCTURE: CPT

## 2020-02-27 PROCEDURE — 97162 PT EVAL MOD COMPLEX 30 MIN: CPT

## 2020-02-27 PROCEDURE — 6360000002 HC RX W HCPCS: Performed by: PHYSICIAN ASSISTANT

## 2020-02-27 PROCEDURE — 6360000002 HC RX W HCPCS: Performed by: NURSE PRACTITIONER

## 2020-02-27 PROCEDURE — 85730 THROMBOPLASTIN TIME PARTIAL: CPT

## 2020-02-27 PROCEDURE — 85025 COMPLETE CBC W/AUTO DIFF WBC: CPT

## 2020-02-27 PROCEDURE — 97530 THERAPEUTIC ACTIVITIES: CPT

## 2020-02-27 PROCEDURE — 2580000003 HC RX 258: Performed by: PHYSICIAN ASSISTANT

## 2020-02-27 PROCEDURE — 97166 OT EVAL MOD COMPLEX 45 MIN: CPT

## 2020-02-27 PROCEDURE — 6370000000 HC RX 637 (ALT 250 FOR IP): Performed by: PHYSICIAN ASSISTANT

## 2020-02-27 PROCEDURE — 6370000000 HC RX 637 (ALT 250 FOR IP): Performed by: NURSE PRACTITIONER

## 2020-02-27 PROCEDURE — 97535 SELF CARE MNGMENT TRAINING: CPT

## 2020-02-27 PROCEDURE — 1200000000 HC SEMI PRIVATE

## 2020-02-27 PROCEDURE — 97116 GAIT TRAINING THERAPY: CPT

## 2020-02-27 PROCEDURE — 85610 PROTHROMBIN TIME: CPT

## 2020-02-27 RX ORDER — SCOLOPAMINE TRANSDERMAL SYSTEM 1 MG/1
1 PATCH, EXTENDED RELEASE TRANSDERMAL
Status: DISCONTINUED | OUTPATIENT
Start: 2020-02-27 | End: 2020-02-27

## 2020-02-27 RX ORDER — HEPARIN SODIUM 5000 [USP'U]/ML
5000 INJECTION, SOLUTION INTRAVENOUS; SUBCUTANEOUS EVERY 8 HOURS SCHEDULED
Status: DISCONTINUED | OUTPATIENT
Start: 2020-02-27 | End: 2020-02-28 | Stop reason: HOSPADM

## 2020-02-27 RX ORDER — SENNA AND DOCUSATE SODIUM 50; 8.6 MG/1; MG/1
2 TABLET, FILM COATED ORAL 2 TIMES DAILY
Status: DISCONTINUED | OUTPATIENT
Start: 2020-02-27 | End: 2020-02-28 | Stop reason: HOSPADM

## 2020-02-27 RX ORDER — DEXAMETHASONE 4 MG/1
4 TABLET ORAL EVERY 6 HOURS SCHEDULED
Status: COMPLETED | OUTPATIENT
Start: 2020-02-27 | End: 2020-02-28

## 2020-02-27 RX ORDER — PANTOPRAZOLE SODIUM 40 MG/1
40 TABLET, DELAYED RELEASE ORAL
Status: DISCONTINUED | OUTPATIENT
Start: 2020-02-27 | End: 2020-02-28 | Stop reason: HOSPADM

## 2020-02-27 RX ORDER — DEXAMETHASONE SODIUM PHOSPHATE 4 MG/ML
4 INJECTION, SOLUTION INTRA-ARTICULAR; INTRALESIONAL; INTRAMUSCULAR; INTRAVENOUS; SOFT TISSUE EVERY 6 HOURS
Status: DISCONTINUED | OUTPATIENT
Start: 2020-02-27 | End: 2020-02-27

## 2020-02-27 RX ORDER — PROCHLORPERAZINE EDISYLATE 5 MG/ML
10 INJECTION INTRAMUSCULAR; INTRAVENOUS EVERY 6 HOURS PRN
Status: DISCONTINUED | OUTPATIENT
Start: 2020-02-27 | End: 2020-02-28 | Stop reason: HOSPADM

## 2020-02-27 RX ORDER — ACETAMINOPHEN 500 MG
1000 TABLET ORAL EVERY 6 HOURS
Status: DISCONTINUED | OUTPATIENT
Start: 2020-02-27 | End: 2020-02-28 | Stop reason: HOSPADM

## 2020-02-27 RX ADMIN — DEXTROSE MONOHYDRATE 2 G: 50 INJECTION, SOLUTION INTRAVENOUS at 05:11

## 2020-02-27 RX ADMIN — HEPARIN SODIUM 5000 UNITS: 5000 INJECTION INTRAVENOUS; SUBCUTANEOUS at 21:32

## 2020-02-27 RX ADMIN — HEPARIN SODIUM 5000 UNITS: 5000 INJECTION INTRAVENOUS; SUBCUTANEOUS at 15:06

## 2020-02-27 RX ADMIN — DEXAMETHASONE SODIUM PHOSPHATE 4 MG: 4 INJECTION, SOLUTION INTRAMUSCULAR; INTRAVENOUS at 09:53

## 2020-02-27 RX ADMIN — ACETAMINOPHEN 1000 MG: 500 TABLET ORAL at 14:58

## 2020-02-27 RX ADMIN — PANTOPRAZOLE SODIUM 40 MG: 40 TABLET, DELAYED RELEASE ORAL at 14:58

## 2020-02-27 RX ADMIN — SENNOSIDES AND DOCUSATE SODIUM 2 TABLET: 8.6; 5 TABLET ORAL at 14:58

## 2020-02-27 RX ADMIN — VENLAFAXINE 75 MG: 75 TABLET ORAL at 17:38

## 2020-02-27 RX ADMIN — LEVETIRACETAM 500 MG: 500 TABLET ORAL at 20:11

## 2020-02-27 RX ADMIN — Medication 10 ML: at 09:54

## 2020-02-27 RX ADMIN — ONDANSETRON 4 MG: 2 INJECTION INTRAMUSCULAR; INTRAVENOUS at 01:11

## 2020-02-27 RX ADMIN — DEXTROSE MONOHYDRATE 2 G: 50 INJECTION, SOLUTION INTRAVENOUS at 14:59

## 2020-02-27 RX ADMIN — OXYCODONE 5 MG: 5 TABLET ORAL at 05:11

## 2020-02-27 RX ADMIN — LEVETIRACETAM 500 MG: 500 TABLET ORAL at 09:52

## 2020-02-27 RX ADMIN — SODIUM CHLORIDE: 9 INJECTION, SOLUTION INTRAVENOUS at 01:09

## 2020-02-27 RX ADMIN — VENLAFAXINE 75 MG: 75 TABLET ORAL at 09:49

## 2020-02-27 RX ADMIN — HYDROCHLOROTHIAZIDE 25 MG: 25 TABLET ORAL at 09:49

## 2020-02-27 RX ADMIN — MORPHINE SULFATE 4 MG: 2 INJECTION, SOLUTION INTRAMUSCULAR; INTRAVENOUS at 01:11

## 2020-02-27 RX ADMIN — ACETAMINOPHEN 1000 MG: 500 TABLET ORAL at 20:11

## 2020-02-27 RX ADMIN — SENNOSIDES AND DOCUSATE SODIUM 2 TABLET: 8.6; 5 TABLET ORAL at 20:11

## 2020-02-27 RX ADMIN — LISINOPRIL 20 MG: 20 TABLET ORAL at 09:49

## 2020-02-27 RX ADMIN — DEXAMETHASONE 4 MG: 4 TABLET ORAL at 23:11

## 2020-02-27 RX ADMIN — DEXAMETHASONE 4 MG: 4 TABLET ORAL at 17:38

## 2020-02-27 RX ADMIN — DEXAMETHASONE 4 MG: 4 TABLET ORAL at 14:58

## 2020-02-27 ASSESSMENT — PAIN SCALES - GENERAL
PAINLEVEL_OUTOF10: 2
PAINLEVEL_OUTOF10: 4
PAINLEVEL_OUTOF10: 9
PAINLEVEL_OUTOF10: 3

## 2020-02-27 ASSESSMENT — PAIN DESCRIPTION - PROGRESSION
CLINICAL_PROGRESSION: NOT CHANGED
CLINICAL_PROGRESSION: NOT CHANGED

## 2020-02-27 ASSESSMENT — PAIN DESCRIPTION - FREQUENCY
FREQUENCY: CONTINUOUS
FREQUENCY: CONTINUOUS

## 2020-02-27 ASSESSMENT — PAIN DESCRIPTION - ORIENTATION
ORIENTATION: MID
ORIENTATION: MID

## 2020-02-27 ASSESSMENT — PAIN DESCRIPTION - PAIN TYPE
TYPE: ACUTE PAIN
TYPE: ACUTE PAIN

## 2020-02-27 ASSESSMENT — PAIN DESCRIPTION - ONSET
ONSET: ON-GOING
ONSET: ON-GOING

## 2020-02-27 ASSESSMENT — PAIN - FUNCTIONAL ASSESSMENT
PAIN_FUNCTIONAL_ASSESSMENT: ACTIVITIES ARE NOT PREVENTED
PAIN_FUNCTIONAL_ASSESSMENT: ACTIVITIES ARE NOT PREVENTED

## 2020-02-27 ASSESSMENT — PAIN DESCRIPTION - DESCRIPTORS
DESCRIPTORS: ACHING
DESCRIPTORS: ACHING

## 2020-02-27 ASSESSMENT — PAIN DESCRIPTION - LOCATION
LOCATION: HEAD
LOCATION: HEAD

## 2020-02-27 NOTE — PROGRESS NOTES
NEUROSURGERY POST-OP PROGRESS NOTE    Patient Name: Amalia Padilla YOB: 1965   Sex: Female Age: 49 yrs     Medical Record Number: [id number] Acct Number: [de-identified]   Room Number: 2548/2526-31 Hospital Day: Hospital Day: 2     Interval History:  Post-operative Day# 1 s/p Procedure(s) (LRB):  RIGHT PARIETAL CRANIOTOMY FOR STEREOTACTIC BIOPSY OF BRAIN MASS (Right)    Subjective: Patient just ate a little breakfast and became nauseated and vomited up large amt of undigested food. Objective:    VITAL SIGNS   /76   Pulse 73   Temp 97.5 °F (36.4 °C) (Oral)   Resp 16   Ht 5' 2\" (1.575 m)   Wt 234 lb (106.1 kg)   LMP 06/25/2019 (Within Months)   SpO2 98%   BMI 42.80 kg/m²    Height Height: 5' 2\" (157.5 cm)   Weight Weight: 234 lb (106.1 kg)        Allergies No Known Allergies   NPO Status DIET GENERAL;   Isolation No active isolations     LABS   Basic Metabolic Profile No results for input(s): NA, CL, CO2, BUN, CREATININE, GLUCOSE, ALB, PHOS, MG in the last 72 hours.     Invalid input(s): POTASSIUM, CA   Complete Blood Count Recent Labs     02/27/20  0551   WBC 13.3*   RBC 4.67      Coagulation Studies Recent Labs     02/27/20  0551   INR 1.18*        MEDICATIONS   Inpatient Medications   dexamethasone, 4 mg, Intravenous, Q6H    levETIRAcetam, 500 mg, Oral, BID    venlafaxine, 75 mg, Oral, BID     sodium chloride flush, 10 mL, Intravenous, 2 times per day    ceFAZolin (ANCEF) IVPB, 2 g, Intravenous, Q8H    heparin (porcine), 5,000 Units, Subcutaneous, Q12H    hydroCHLOROthiazide, 25 mg, Oral, Daily    lisinopril, 20 mg, Oral, Daily   Infusions      Antibiotics   Recent Abx Admin                   ceFAZolin (ANCEF) 2 g in dextrose 5 % 50 mL IVPB (g) 2 g New Bag 02/27/20 0511     2 g New Bag 02/26/20 2111    ceFAZolin (ANCEF) 2 g in dextrose 5 % 50 mL IVPB (g) 2 g Given 02/26/20 1330                 Neurologic Exam:  Mental status: awake and alert and oriented x4    Cranial Nerves:  II: Visual acuity not tested,L visual field cut, denies new visual changes / diplopia  III, IV, VI: PERRL, 3 mm bilaterally, EOMI, no nystagmus noted  V: Facial sensation intact bilaterally to touch  VII: Face symmetric  VIII: Hearing intact bilaterally to spoken voice  IX: Palate movement equal bilaterally  XI: Shoulder shrug equal bilaterally  XII: Tongue midline      Musculoskeletal:   Gait: Not tested   Tone: normal  Sensory: intact to all extremities  Motor strength:    Right  Left    Right  Left    Deltoid  5 5  Hip Flex  5 5   Biceps  5 5  Knee Extensors  5 5   Triceps  5 5  Knee Flexors  5 5   Wrist Ext  5 5  Ankle Dorsiflex. 5 5   Wrist Flex  5 5  Ankle Plantarflex. 5 5   Handgrip  5 5  Ext Raymond Longus  5 5   Thumb Ext  5 5         Incision: intact, clean and dry    Respiratory:  Unlabored respiratory pattern    Abdomen:   Soft, ND     Cardiovascular:  Warm, well perfused    Assessment   Patient is a 50 yo F s/p Procedure(s) (LRB):  RIGHT PARIETAL CRANIOTOMY FOR STEREOTACTIC BIOPSY OF BRAIN MASS (Right) per Dr. Michael Ortiz on 2/26/2020     Plan:  1. Neurologic exam frequency:q4h  2. Mobility:PT/OT eval  3. Steroids: started decadron 4 q 6  4. Seizure Prophylaxis: keppra  5. Diet:as tolerated  6. Antibiotics:completed  7. Perez:remove  8. DVT Prophylaxis: SCDs and heparin sq  9. GI Prophylaxis: protonix  10. Bowel Regimen: senna  11. Pain control:oxy and tylenol  12. Incisional Care: open to air and shower  13. Dispo Planning:poss tomorrow    Patient was seen with Dr. Michael Ortiz who agrees with above assessment and plan. Electronically signed by:  Ottoniel Fan, 2/27/2020 1:02 PM   Neurosurgery Nurse Practitioner  [phone]

## 2020-02-27 NOTE — PROGRESS NOTES
Occupational Therapy   Occupational Therapy Initial Assessment  Date: 2020   Patient Name: Khai Barr  MRN: [id number]     : 1965    Date of Service: 2020  Assessment: Functional independence decreased from baseline s/p R parietal craniotomy. Pt demos decreased short term memory, decreased insight and decreased awareness of deficits. Pt demo L visual field cut and requires max cues to scan visual field. Pt performs mobility and ADLs with CGA, but not safely due to inattention to L visual field. If  able to provide close 24 hr spvn upon d/c then recommend home with [address] [place] [address]. If  unable to provide spvn, recommend continued inpt OT services at d/c. Discharge Recommendations:Lillian Turner scored a 17/24 on the AM-PAC ADL Inpatient form. Current research shows that an AM-PAC score of 17 or less is typically not associated with a discharge to the patient's home setting. Based on the patients AM-PAC score and their current ADL deficits, it is recommended that the patient have 3-5 sessions per week of Occupational Therapy at d/c to increase the patients independence. See assessment. HOME HEALTH CARE: LEVEL 1 STANDARD    - Initial home health evaluation to occur within 24-48 hours, in patient home   - Therapy to evaluate with goal of regaining prior level of functioning   - Therapy to evaluate if patient has [address] [place] [address] needs for personal care       OT Equipment Recommendations  Equipment Needed: No    Assessment   Performance deficits / Impairments: Decreased functional mobility ; Decreased vision/visual deficit; Decreased ADL status; Decreased safe awareness;Decreased high-level IADLs  Treatment Diagnosis: decreased activity tolerance; impaired ADLs, mobility, and vision   Decision Making: Medium Complexity  OT Education: OT Role;Plan of Care;Low Vision Education  REQUIRES OT FOLLOW UP: Yes  Activity Tolerance  Activity Tolerance: Patient Tolerated treatment well  Safety Devices  Safety Devices in place: Yes  Type of devices: Call light within reach; Chair alarm in place; Left in chair;Nurse notified             Treatment Diagnosis: decreased activity tolerance; impaired ADLs, mobility, and vision       Restrictions  Position Activity Restriction  Other position/activity restrictions: ambulate pt, up with assist     Subjective   General  Chart Reviewed: Yes  Additional Pertinent Hx: 49 y.o. F admitted 2/26 for R parietal craniotomy for mass biopsy. PMHx: HTN, brain tumorm pilonidal cyst, lipoma resection  Family / Caregiver Present: Yes( present )  Referring Practitioner: Daniel Bosworth   Diagnosis: Neoplasm, brain   Subjective  Subjective: Pt in chair upon entry. Pleasant and agreeable to therapy. Pain Assessment  Pain Assessment: 0-10(7/10 headache; RN aware )     Social/Functional History  Social/Functional History  Lives With: Spouse  Type of Home: House  Home Layout: Two level, Bed/Bath upstairs, 1/2 bath on main level  Home Access: Stairs to enter with rails  Entrance Stairs - Number of Steps: 3+7 KAVITHA   Entrance Stairs - Rails: Right  Bathroom Shower/Tub: Tub/Shower unit  Bathroom Toilet: Standard(sink for leverage)  Bathroom Equipment: Grab bars in shower, Hand-held shower  Home Equipment: (owns no DME )  ADL Assistance: Independent(few days prior to admission pt was needing assist 2/2 dizziness)  Homemaking Assistance: Independent( does most cooking)  Ambulation Assistance: Independent(needed assist just prior to admission 2/2 unsteadiness - normally independent )  Transfer Assistance: Independent  Active : Yes(hasnt driven for a few weeks 2/2 dizziness and confusion )  Occupation: (couldn't work last ~2-3 weeks)  Type of occupation: Office work  Additional Comments: Pt reports no recent falls.         Objective   Vision: Within Functional Limits  Vision Exceptions: (contacts at all times )    Orientation  Overall Orientation Status: Impaired(Pt knew it was after

## 2020-02-27 NOTE — CONSULTS
reports that she has never smoked. She has never used smokeless tobacco.  ETOH:   reports previous alcohol use. Family History:  Reviewed in detail and negative for DM, Early CAD,Cancer, CVA. Positive as follows:    History reviewed. No pertinent family history. REVIEW OF SYSTEMS:       Review of Systems as mentioned in HPI          PHYSICAL EXAM:    /82   Pulse 62   Temp 97.5 °F (36.4 °C) (Oral)   Resp 16   Ht 5' 2\" (1.575 m)   Wt 234 lb (106.1 kg)   LMP 06/25/2019 (Within Months)   SpO2 94%   BMI 42.80 kg/m²     Physical Exam  Constitutional:       Appearance: Normal appearance. HENT:      Head: Normocephalic and atraumatic. Nose: Nose normal. No congestion. Mouth/Throat:      Mouth: Mucous membranes are dry. Neck:      Musculoskeletal: Normal range of motion and neck supple. Cardiovascular:      Rate and Rhythm: Normal rate and regular rhythm. Pulses: Normal pulses. Heart sounds: Normal heart sounds. Pulmonary:      Effort: Pulmonary effort is normal.      Breath sounds: Normal breath sounds. Abdominal:      General: Abdomen is flat. Palpations: Abdomen is soft. Musculoskeletal: Normal range of motion. General: No swelling or tenderness. Skin:     General: Skin is warm and dry. Neurological:      General: No focal deficit present. Mental Status: She is alert and oriented to person, place, and time. Cranial Nerves: No cranial nerve deficit. Psychiatric:         Mood and Affect: Mood normal.         Behavior: Behavior normal.                  CBC   Recent Labs     02/27/20  0551   WBC 13.3*   HGB 14.2   HCT 42.8         RENAL  No results for input(s): NA, K, CL, CO2, PHOS, BUN, CREATININE in the last 72 hours. Invalid input(s): CA  LFT'S  No results for input(s): AST, ALT, ALB, BILIDIR, BILITOT, ALKPHOS in the last 72 hours.   COAG  Recent Labs     02/27/20  0551   INR 1.18*     CARDIAC ENZYMES  No results for

## 2020-02-27 NOTE — PROGRESS NOTES
Patient A&Ox4, VSS. Neuro checks at baseline, pt with delayed responses and impaired left vision. Surgical incision clean, dry, and intact. Patient ambulates with walker and gait belt, tolerates fairly well, occassionally unsteady. Pain well managed with oral medications. Patient's nausea improved through the day, tolerating diet well. Patient and family instructed to call out for needs. Fall precautions in place.

## 2020-02-27 NOTE — PLAN OF CARE
Problem: Falls - Risk of:  Goal: Will remain free from falls  Description  Fall precautions in place. Bed is in lowest position, wheels locked, alarm on, non-skid socks on. Call light and bedside table within reach. Patient calls out appropriately. Patient is resting comfortably. Will continue to assess and monitor. Outcome: Ongoing     Problem: Confusion - Acute:  Goal: Absence of continued neurological deterioration signs and symptoms  Description  Absence of continued neurological deterioration signs and symptoms. Pt drowsy and oriented to self and situation.  and bedside.   Outcome: Ongoing

## 2020-02-27 NOTE — PLAN OF CARE
Problem: Falls - Risk of:  Goal: Will remain free from falls  Description  Will remain free from falls  2/27/2020 0154 by Cali South RN  Outcome: Ongoing   Patient has remained free of falls. 2/4 bed rails up, bed locked and in lowest position, call light within reach. Patient instructed on use of call light and uses appropriately. Bed alarm on. Non-skid footwear and fall band on. Will continue to monitor. Problem: Pain:  Goal: Pain level will decrease  Description  Pain level will decrease  Outcome: Ongoing   Numeric pain rating scale being used. Patient repositioned for comfort. Ice applied. Patient is tolerating IV pain medicine for head pain until nausea subsides. Will continue to assess.

## 2020-02-27 NOTE — PLAN OF CARE
Problem: Falls - Risk of:  Goal: Will remain free from falls  Description  Patient in chair with alarm and nonskid socks on, call light, belongings, and bedside table within reach. Patient educated on using call light and instructed to call out for assistance when getting out of bed. AVAsys monitoring on for safety.     Outcome: Ongoing

## 2020-02-27 NOTE — PROGRESS NOTES
Physical Therapy    Facility/Department: [place] [address] 5T ORTHO/NEURO  Initial Assessment/Treatment    NAME: Carrie Escobedo  : 1965  MRN: [id number]    Date of Service: 2020    Discharge Recommendations:    Carrie Escobedo scored a 18/24 on the AM-PAC short mobility form. Current research shows that an AM-PAC score of 18 or greater is typically associated with a discharge to the patient's home setting. Based on the patients AM-PAC score and their current functional mobility deficits, it is recommended that the patient have 2-3 sessions per week of Physical Therapy at d/c to increase the patients independence. HOME HEALTH CARE: LEVEL 1 STANDARD    - Initial home health evaluation to occur within 24-48 hours, in patient home   - Therapy to evaluate with goal of regaining prior level of functioning   - Therapy to evaluate if patient has [address] needs for personal care    PT Equipment Recommendations  Equipment Needed: Yes  Mobility Devices: Kt Meline: Rolling    Assessment   Body structures, Functions, Activity limitations: Decreased functional mobility ; Decreased balance;Decreased endurance;Decreased cognition;Decreased vision/visual deficit  Assessment: 49 y.o. female patient with Neoplasm, brain ([facility] [address] D49.6 who under went a Procedure(s) (LRB): RIGHT PARIETAL CRANIOTOMY FOR STEREOTACTIC BIOPSY OF BRAIN MASS. Pt requiring CGA for all mobility including amb with RW. Pt with L visual cut causing L veering during amb. Pt noted to have impaired short term memory and some confusion. Pt planning to d/c home with 24hr A from . Pt would benefit from HHPT to maximize safety and independence with functional mobility. Pt will require a RW prior to d/c for safe amb at home. Will continue to follow.    Treatment Diagnosis: Decreased functional mobility, balance and L visual cut   Prognosis: Good  Decision Making: Medium Complexity  Patient Education: role of PT, use of call light, d/c planning; cooking)  Ambulation Assistance: Independent(needed assist just prior to admission 2/2 unsteadiness - normally independent )  Transfer Assistance: Independent  Active : Yes(hasnt driven for a few weeks 2/2 dizziness and confusion )  Occupation: (couldn't work last ~2-3 weeks)  Type of occupation: Office work  Additional Comments: Pt reports no recent falls. Objective  AROM RLE (degrees)  RLE AROM: WFL  AROM LLE (degrees)  LLE AROM : WFL  Strength RLE  Strength RLE: WFL  Strength LLE  Strength LLE: WFL        Bed mobility  Comment: NT - pt up in chair at start and end of session. Transfers  Sit to Stand: Contact guard assistance  Stand to sit: Contact guard assistance  Ambulation  Ambulation?: Yes  More Ambulation?: Yes  Ambulation 1  Surface: level tile  Device: Rolling Walker  Assistance: Contact guard assistance  Quality of Gait: slow guarded step to gait that progressed slightly to reciprocal guarded gait - pt veering to the L and bumping into object on L 2/2 L visual cut   Distance: 150'+200'   Comments: VC for scanning to L but minimal improvement noted  Ambulation 2  Surface - 2: level tile  Device 2: No device  Assistance 2: Contact guard assistance  Quality of Gait 2: slow and guarded gait, unsteady - reaching out for objects for balance  Distance: 10'   Stairs/Curb  Stairs?: Yes  Stairs  # Steps : 12  Stairs Height: 6\"  Rails: Right ascending  Device: No Device  Assistance: Contact guard assistance  Comment: slow, guarded with reports that stairs \"look weird, slanted\". Balance  Posture: Good  Sitting - Static: Good  Sitting - Dynamic: Good  Standing - Static: Good  Standing - Dynamic: Fair;+(with RW and CGA - veering L and bumping into several objects )      PT evaluation and treatment initiated. Treatment included gait and transfer training as well as patient education.     Plan   Plan  Times per week: 5-7  Times per day: Daily  Current Treatment Recommendations: Strengthening, Transfer Training, Endurance Training, Balance Training, Gait Training, Stair training, Functional Mobility Training, Safety Education & Training, Home Exercise Program  Safety Devices  Type of devices: Gait belt, Left in chair, Chair alarm in place, Call light within reach, Nurse notified    AM-PAC Score  AM-PAC Inpatient Mobility Raw Score : 18 (02/27/20 1007)  AM-PAC Inpatient T-Scale Score : 43.63 (02/27/20 1007)  Mobility Inpatient CMS 0-100% Score: 46.58 (02/27/20 1007)  Mobility Inpatient CMS G-Code Modifier : CK (02/27/20 1007)          Goals  Short term goals  Time Frame for Short term goals: d/c  Short term goal 1: sup<>sit supervision   Short term goal 2: sit<>stand supervision with RW  Short term goal 3: amb 150' with RW and supervision   Short term goal 4: ascend/descend flight of steps with HR and SBA  Patient Goals   Patient goals : return home when able       Therapy Time   Individual Concurrent Group Co-treatment   Time In 0821         Time Out 0904         Minutes 43           Timed Code Treatment Minutes:  28    Total Treatment Minutes:  43    If the patient is discharged before the next treatment session, this note will serve as the discharge summary.      Donavan Sheth, PT, DPT [id number]

## 2020-02-27 NOTE — PROGRESS NOTES
Nutrition Assessment (Low Risk)    Type and Reason for Visit: Initial, Positive Nutrition Screen    Nutrition Recommendations: Continue current diet     Nutrition Assessment:  RD triggered for malnutrition per nutrition screen. Pt w/no evidence of weight loss per EMR review. Pt admitted for craniotomy 2/26/20 for tx of brain mass. Pt currently on general diet with varied intake, as expected, POD1. Pt will be monitored for ability to meet nutrition needs for optimal postop healing.      Malnutrition Assessment:  · Malnutrition Status: No malnutrition    Nutrition Risk Level   Risk Level: Low    Nutrition Diagnosis:   · Problem: No nutrition diagnosis at this time    Nutrition Intervention:  Food and/or Delivery: Continue current diet  Nutrition Education/Counseling/Coordination of Care:  Continued Inpatient Monitoring      Electronically signed by Henry Lewis RD, LD on 2/27/20 at 2:27 PM    Contact Number: [phone]

## 2020-02-27 NOTE — PROGRESS NOTES
Pt admitted to [address].  at bedside. Vitals stable, pt drowsy. Incision is CD&I, ice applied. Pt currently down for scheduled head CT. Pt denies pain, nausea/vomiting.

## 2020-02-28 VITALS
BODY MASS INDEX: 43.06 KG/M2 | TEMPERATURE: 98 F | WEIGHT: 234 LBS | OXYGEN SATURATION: 95 % | RESPIRATION RATE: 16 BRPM | SYSTOLIC BLOOD PRESSURE: 131 MMHG | HEIGHT: 62 IN | HEART RATE: 69 BPM | DIASTOLIC BLOOD PRESSURE: 82 MMHG

## 2020-02-28 PROCEDURE — 6370000000 HC RX 637 (ALT 250 FOR IP): Performed by: PHYSICIAN ASSISTANT

## 2020-02-28 PROCEDURE — 97530 THERAPEUTIC ACTIVITIES: CPT

## 2020-02-28 PROCEDURE — 97116 GAIT TRAINING THERAPY: CPT

## 2020-02-28 PROCEDURE — 6360000002 HC RX W HCPCS: Performed by: NURSE PRACTITIONER

## 2020-02-28 PROCEDURE — 6370000000 HC RX 637 (ALT 250 FOR IP): Performed by: NURSE PRACTITIONER

## 2020-02-28 PROCEDURE — 97535 SELF CARE MNGMENT TRAINING: CPT

## 2020-02-28 RX ORDER — OXYCODONE HYDROCHLORIDE 5 MG/1
5 TABLET ORAL EVERY 6 HOURS PRN
Qty: 28 TABLET | Refills: 0 | Status: SHIPPED | OUTPATIENT
Start: 2020-02-28 | End: 2020-03-06

## 2020-02-28 RX ORDER — PANTOPRAZOLE SODIUM 40 MG/1
40 TABLET, DELAYED RELEASE ORAL
Qty: 30 TABLET | Refills: 3 | Status: ON HOLD | OUTPATIENT
Start: 2020-02-29 | End: 2021-07-28 | Stop reason: HOSPADM

## 2020-02-28 RX ORDER — DEXAMETHASONE 4 MG/1
4 TABLET ORAL EVERY 6 HOURS
Qty: 94 TABLET | Refills: 0 | Status: SHIPPED | OUTPATIENT
Start: 2020-02-28 | End: 2020-03-03

## 2020-02-28 RX ORDER — SENNA AND DOCUSATE SODIUM 50; 8.6 MG/1; MG/1
2 TABLET, FILM COATED ORAL 2 TIMES DAILY
COMMUNITY
Start: 2020-02-28 | End: 2020-06-24 | Stop reason: DRUGHIGH

## 2020-02-28 RX ADMIN — LISINOPRIL 20 MG: 20 TABLET ORAL at 07:59

## 2020-02-28 RX ADMIN — DEXAMETHASONE 4 MG: 4 TABLET ORAL at 05:31

## 2020-02-28 RX ADMIN — SENNOSIDES AND DOCUSATE SODIUM 2 TABLET: 8.6; 5 TABLET ORAL at 07:59

## 2020-02-28 RX ADMIN — HEPARIN SODIUM 5000 UNITS: 5000 INJECTION INTRAVENOUS; SUBCUTANEOUS at 05:31

## 2020-02-28 RX ADMIN — PANTOPRAZOLE SODIUM 40 MG: 40 TABLET, DELAYED RELEASE ORAL at 05:31

## 2020-02-28 RX ADMIN — LEVETIRACETAM 500 MG: 500 TABLET ORAL at 07:58

## 2020-02-28 RX ADMIN — HYDROCHLOROTHIAZIDE 25 MG: 25 TABLET ORAL at 07:58

## 2020-02-28 RX ADMIN — VENLAFAXINE 75 MG: 75 TABLET ORAL at 07:58

## 2020-02-28 NOTE — DISCHARGE SUMMARY
known as:  [address]     MULTIPLE VITAMINS-MINERALS ER PO     OCUVITE EXTRA PO           Where to Get Your Medications      You can get these medications from any pharmacy    Bring a paper prescription for each of these medications  · dexamethasone 4 MG tablet  · oxyCODONE 5 MG immediate release tablet  · pantoprazole 40 MG tablet  You don't need a prescription for these medications  · sennosides-docusate sodium 8.6-50 MG tablet         Discharge Destination:  The patient was discharged to Home. Follow-up:  The patient is to follow-up with Neel Chavez. Frida Abarca MD in the office in 2 weeks      Discharge Instructions:   Verbal and written discharge instructions were given to the patient at the time of discharge. Electronically signed by:  AMRIT Mancera, 2/28/2020 12:45 PM  [phone]

## 2020-02-28 NOTE — DISCHARGE INSTR - COC
Readings from Last 1 Encounters:   20 234 lb (106.1 kg)     Mental Status:  {IP PT MENTAL STATUS:}    IV Access:  { SONNY IV ACCESS:917813394}    Nursing Mobility/ADLs:  Walking   {CHP DME EIAO:074375779}  Transfer  {CHP DME HAEY:909445227}  Bathing  {CHP DME OJPI:956143633}  Dressing  {CHP DME YBCE:179741252}  Toileting  {CHP DME HWJE:251203575}  Feeding  {CHP DME IFIC:071502181}  Med Admin  {P DME PYJP:065126014}  Med Delivery   { SONNY MED Delivery:464067501}    Wound Care Documentation and Therapy:        Elimination:  Continence:   · Bowel: {YES / CN:11160}  · Bladder: {YES / T}  Urinary Catheter: {Urinary Catheter:596613871}   Colostomy/Ileostomy/Ileal Conduit: {YES / SC:66953}       Date of Last BM: ***    Intake/Output Summary (Last 24 hours) at 2020 1304  Last data filed at 2020 1148  Gross per 24 hour   Intake --   Output 1600 ml   Net -1600 ml     I/O last 3 completed shifts:   In: 240 [P.O.:240]  Out: 1850 [Urine:1850]    Safety Concerns:     [address] Safety Concerns:502448504}    Impairments/Disabilities:      [address] Impairments/Disabilities:347243360}    Nutrition Therapy:  Current Nutrition Therapy:   [address] Diet List:052685694}    Routes of Feeding: {P DME Other Feedings:348735065}  Liquids: {Slp liquid thickness:23085}  Daily Fluid Restriction: {CHP DME Yes amt example:126819216}  Last Modified Barium Swallow with Video (Video Swallowing Test): {Done Not Done QWUI:511851455}    Treatments at the Time of Hospital Discharge:   Respiratory Treatments: ***  Oxygen Therapy:  {Therapy; copd oxygen:22797}  Ventilator:    [facility] Vent NIPW:191960989}    Rehab Therapies: Physical Therapy, Occupational Therapy and Skilled Nurse  Weight Bearing Status/Restrictions: [facility] Weight Bearin}  Other Medical Equipment (for information only, NOT a DME order):  {EQUIPMENT:247406343}  Other Treatments: ***    Patient's personal belongings (please select all that are sent with patient):  {CHP DME Belongings:284100990}    RN SIGNATURE:  {Esignature:496308015}    CASE MANAGEMENT/SOCIAL WORK SECTION    Inpatient Status Date: ***    Readmission Risk Assessment Score:  Readmission Risk              Risk of Unplanned Readmission:        12           Discharging to Facility/ Agency   Name:  [facility] care    Address: [address] [place] [address] [address] [facility] [address], [address]  Phone: [phone]  Fax: [phone]      / signature: {Esignature:475891502}    PHYSICIAN SECTION    Prognosis: {Prognosis:8977313989}    Condition at Discharge: [address] [place] [address] Patient Condition:426749458}    Rehab Potential (if transferring to Rehab): {Prognosis:1489478700}    Recommended Labs or Other Treatments After Discharge: ***    Physician Certification: I certify the above information and transfer of Jennyfer Gomes  is necessary for the continuing treatment of the diagnosis listed and that she requires {Admit to Appropriate Level of Care:68479} for {GREATER/LESS:576953618} 30 days.      Update Admission H&P: {CHP DME Changes in [facility]:028775600}    PHYSICIAN SIGNATURE:  {Esignature:534870074}

## 2020-02-28 NOTE — PLAN OF CARE
Problem: Falls - Risk of:  Goal: Will remain free from falls  Description  Patient in chair with alarm and nonskid socks on, call light, belongings, and bedside table within reach. Patient educated on using call light and instructed to call out for assistance when getting out of bed. AVAsys monitoring on for safety.     Outcome: Ongoing     Problem: Confusion - Acute:  Goal: Absence of continued neurological deterioration signs and symptoms  Description  Absence of continued neurological deterioration signs and symptoms  Outcome: Ongoing

## 2020-02-28 NOTE — PROGRESS NOTES
Patient discharged with belongings and new medications. Follow up and discharge instructions reviewed with patient and patient's spouse. Patient transported to main entrance via wheelchair, spouse at [facility].

## 2020-02-28 NOTE — PROGRESS NOTES
onto bed. Cues for hand placement. Ambulation  Assistance Level: SBA  Assistive device: Wheeled walker  Distance: 360 ft total (one lap around unit)  Quality of gait: Steady; step-through pattern; slow and cautious  Other: Cues to occasionally scan environment due to visual deficits. Stairs  Up/down 11 steps with B hands on one railing CGA to SBA  Other: Step-to-step pattern. Slow and cautious. Bed mobility  Supine to sit: Supervision, HOB 30 degrees, no railing  Sit to Supine: Supervision, HOB 30 degrees, no railing    Patient/Family Education  Instructed  in donning and use of gait belt for stairs. Expressed understanding. Scanning environment due to visual deficits. Needs reminders/cueing. Hand placement with transfers when using walker. Needs reminders/cueing. Safety Devices  Pt left with needs in reach. In chair with chair alarm on. RN aware. AM-PAC score  AM-PAC Inpatient Mobility Raw Score : 18  AM-PAC Inpatient T-Scale Score : 43.63  Mobility Inpatient CMS 0-100% Score: 46.58  Mobility Inpatient CMS G-Code Modifier : CK    Goals: (as determined and assessed by primary PT)  Time Frame for Short term goals: d/c  Short term goal 1: sup<>sit supervision    Short term goal 2: sit<>stand supervision with RW   Short term goal 3: amb 150' with RW and supervision   Short term goal 4: ascend/descend flight of steps with HR and SBA     Plan:  Times per week: 5-7; Times per day: Daily  Current Treatment Recommendations: Strengthening, Transfer Training, Endurance Training, Balance Training, Gait Training, Stair training, Functional Mobility Training, Safety Education & Training, Home Exercise Program    Therapy Time    Individual  Concurrent  Group  Co-treatment    Time In  925            Time Out  1005            Minutes  40              Timed Code Treatment Minutes: 40  Total Treatment Minutes: 40    Tentative D/C home today with 24 hour assist of family.    Will continue per plan of care if not D/Amanuel.      Johanny Pineda #[id number]

## 2020-02-28 NOTE — PROGRESS NOTES
Pertinent Hx: 49 y.o. F admitted 2/26 for R parietal craniotomy for biopsy. PMHx: HTN, brain tumorm pilonidal cyst, lipoma resection  Family / Caregiver Present: Yes( present )  Referring Practitioner: Reymundo Fisher   Diagnosis: Neoplasm, brain   Subjective  Subjective: Pt in chair upon entry. Pleasant and agreeable to therapy. \"I think I need to go to the bathroom. \"   Pain Assessment  Pain Assessment: 0-10(unrated HA; RN aware )   Orientation  Orientation  Overall Orientation Status: Within Functional Limits  Objective    ADL  Grooming: Independent;Setup; Increased time to complete(SBA for stance at sink; pt displays ideational apraxia during grooming tasks, attempting to use comb as toothpaste )  UE Dressing: Increased time to complete;Setup;Minimal assistance(Min A for bra )  LE Dressing: Stand by assistance; Increased time to complete;Setup;Verbal cueing  Toileting: Minimal assistance; Increased time to complete(Max cues needed for safe use of walker, hand placement, and to scan L visual field )        Balance  Sitting Balance: Supervision  Standing Balance: Stand by assistance(SBA for stance; max cues for L visual field scanning )  Standing Balance  Time: 2 min + 5min + 2 min + 8 min   Activity: functional mobility in room and bathroom for ADLs; ambulation in hallway   Comment: SBA for gait, but still requiring max cues to scan L visual field. Max cues needed to avoid obstacles on L side of hallway. CGA for transfers, but also requiring max cues for safety, walker use, and handplacment. SBA for stance at sink   Functional Mobility  Functional - Mobility Device: Rolling Walker  Toilet Transfers  Toilet - Technique: Ambulating  Equipment Used: Standard toilet  Toilet Transfer: Contact guard assistance  Toilet Transfers Comments: Max cues needed for navigation with walker and safety before sitting.    Bed mobility  Supine to Sit: Modified independent(HOB raised, handrails used, slow movement )  Scooting: Modified independent(HOB raised )  Transfers  Sit to stand: Stand by assistance  Stand to sit: Stand by assistance  Transfer Comments: Pt demo poor insight when sitting. Pt requiring max cues to feel chair on back of legs, hand placement, and to sit slowly. Pt also needing max cues for safe walker use. Vision  Patient Visual Report: Difficulty maintaining concentration with focus  Vision Comment: Pt likely experiencing L homonymous hemianopsia. Pt requires cues to turn head to scan whole visual field. Pt requires max cues to avoid obstacles on L side of room and hallway with ambulation. Pt demos verbal understanding of head scanning technique, but does not demo understanding with functional tasks. Cognition  Overall Cognitive Status: Exceptions  Arousal/Alertness: Appropriate responses to stimuli  Following Commands: Follows one step commands with repetition; Follows one step commands with increased time  Attention Span: Appears intact  Memory: Decreased recall of biographical Information;Decreased short term memory;Decreased long term memory  Safety Judgement: Decreased awareness of need for assistance;Decreased awareness of need for safety  Problem Solving: Assistance required to implement solutions;Assistance required to identify errors made;Decreased awareness of errors;Assistance required to correct errors made;Assistance required to generate solutions  Insights: Decreased awareness of deficits  Initiation: Requires cues for some  Sequencing: Requires cues for some  Cognition Comment: Pt demos decreased insight and safety awareness with L homonymous hemianopsia     Perception  Overall Perceptual Status: Impaired(Pt reports being able to see obstacles in hallway on L when turning head to scan visual field, but with diminished depth perception. )               Plan   Plan  Times per week: 5-7x  Times per day: Daily  Current Treatment Recommendations: Strengthening, Functional Mobility Training, Patient/Caregiver Education & Training, Endurance Training, Equipment Evaluation, Education, & procurement, Balance Training, Safety Education & Training, Self-Care / ADL, Cognitive/Perceptual Training    If pt discharges prior to next treatment, this note will serve as d/c summary. Continue per POC if pt does not d/c. AM-PAC Score  AM-PAC Inpatient Daily Activity Raw Score: 18 (02/28/20 1022)  AM-PAC Inpatient ADL T-Scale Score : 38.66 (02/28/20 1022)  ADL Inpatient CMS 0-100% Score: 46.65 (02/28/20 1022)  ADL Inpatient CMS G-Code Modifier : CK (02/28/20 1022)    Goals  Short term goals  Time Frame for Short term goals: by d/c   Short term goal 1: particpate in lower body dressing routine - met 2/28; complete lower body dressing with spvn - not met   Short term goal 2: Attend to L visual field during session with min cues - not met   Short term goal 3: complete chair/commode transfer with CGA and no more than 2 verbal cues - not met   Patient Goals   Patient goals : to go home        Therapy Time   Individual Concurrent Group Co-treatment   Time In 0812         Time Out 0857         Minutes 45         Timed Code Tx Min:45  Total Tx Time:45    Therapist was present, directed the patient's care, made skilled judgment, and was responsible for assessment and treatment of the patient.       Christine Nielsen, s/OT

## 2020-02-28 NOTE — CARE COORDINATION
Case Management Assessment            Discharge Note                    Date / Time of Note: 2020 12:56 PM                  Discharge Note Completed by: Kamila Bowens    Patient Name: Arleen Mcgee   YOB: 1965  Diagnosis: Neoplasm, brain [facility]) [D49.6]  Brain mass [G93.89]   Date / Time: 2020  7:41 AM    Current PCP: Greer Apgar, MD  Clinic patient: No    Hospitalization in the last 30 days: No    Advance Directives:  Code Status: Full Code  [place] DNR form completed and on chart: No    Financial:  Payor: Susan Leisure / Plan: [address] [place] [address] / Product Type: *No Product type* /      Pharmacy:  No Pharmacies [id number] [place] [employer] medications?: Potential Assistance Purchasing Medications: No  Assistance provided by Case Management: None at this time    Does patient want to participate in local refill/ meds to beds program?: Yes    Meds To [facility] Audio Rules:  1. Can ONLY be done Monday- Friday between 8:30am-5pm  2. Prescription(s) must be in pharmacy by 3pm to be filled same day  3. Copy of patient's insurance/ prescription drug card and patient face sheet must be sent along with the prescription(s)  4. Cost of Rx cannot be added to hospital bill. If financial assistance is needed, please contact unit  or ;  or  CANNOT provide pharmacy voucher for patients co-pays  5.  Patients can then  the prescription on their way out of the hospital at discharge, or pharmacy can deliver to the bedside if staff is available. (payment due at time of pick-up or delivery - cash, check, or card accepted)     Able to afford home medications/ co-pay costs: Yes    ADLS:  Current PT AM-PAC Score:   Current OT AM-PAC Score:       DISCHARGE Disposition: Home with  [place] OD[facility]: [facility]     LOC at discharge: Not Applicable  SONNY Completed: Not Indicated    Notification completed in HENS/PAS?:  Not Applicable    IMM Completed:   Not Indicated    Transportation:  Transportation PLAN for discharge: family   Mode of Transport: [address] ordered at discharge: Yes  [address]: Lizzie Velazquez  Phone: [phone]  Fax: [phone]  Orders faxed: Yes    Durable Medical Equipment:  DME Provider: Marleny Rodriguez obtained during hospitalization: walker      The Plan for Transition of Care is related to the following treatment goals of Neoplasm, brain ([facility] [address] [D49.6]  Brain mass [G93.89]    The Patient and/or patient representative Rex Chinchilla and her family were provided with a choice of provider and agrees with the discharge plan Yes    Freedom of choice list was provided with basic dialogue that supports the patient's individualized plan of care/goals and shares the quality data associated with the providers.  Not Indicated    Care Transitions patient: No    Jerson Dumont RN  [facility]  Case Management Department  Ph: [phone]  Fax: [phone]

## 2020-02-28 NOTE — CARE COORDINATION
[facility] order noted, all docs needed have been faxed to [facility] for home care services.         Tiburcio Presley  Work mobile: [phone]  [facility] office: [phone]

## 2020-02-28 NOTE — PROGRESS NOTES
NEUROSURGERY POST-OP PROGRESS NOTE    Patient Name: Soheila Amaya YOB: 1965   Sex: Female Age: 49 yrs     Medical Record Number: [id number] Acct Number: [de-identified]   Room Number: 2149/2731-94 Hospital Day: Hospital Day: 3     Interval History:  Post-operative Day# 2 s/p Procedure(s) (LRB):  RIGHT PARIETAL CRANIOTOMY FOR STEREOTACTIC BIOPSY OF BRAIN MASS (Right)    Subjective: Patient denies nausea today  Objective:    VITAL SIGNS   BP (!) 151/84   Pulse 69   Temp 98.3 °F (36.8 °C) (Oral)   Resp 18   Ht 5' 2\" (1.575 m)   Wt 234 lb (106.1 kg)   LMP 06/25/2019 (Within Months)   SpO2 95%   BMI 42.80 kg/m²    Height Height: 5' 2\" (157.5 cm)   Weight Weight: 234 lb (106.1 kg)        Allergies No Known Allergies   NPO Status DIET GENERAL;   Isolation No active isolations     LABS   Basic Metabolic Profile No results for input(s): NA, CL, CO2, BUN, CREATININE, GLUCOSE, ALB, PHOS, MG in the last 72 hours.     Invalid input(s): POTASSIUM, CA   Complete Blood Count Recent Labs     02/27/20  0551   WBC 13.3*   RBC 4.67      Coagulation Studies Recent Labs     02/27/20  0551   INR 1.18*        MEDICATIONS   Inpatient Medications   heparin (porcine), 5,000 Units, Subcutaneous, 3 times per day    pantoprazole, 40 mg, Oral, QAM AC    sennosides-docusate sodium, 2 tablet, Oral, BID    acetaminophen, 1,000 mg, Oral, Q6H    levETIRAcetam, 500 mg, Oral, BID    venlafaxine, 75 mg, Oral, BID     sodium chloride flush, 10 mL, Intravenous, 2 times per day    hydroCHLOROthiazide, 25 mg, Oral, Daily    lisinopril, 20 mg, Oral, Daily   Infusions      Antibiotics   Recent Abx Admin                   ceFAZolin (ANCEF) 2 g in dextrose 5 % 50 mL IVPB (g) 2 g New Bag 02/27/20 0200                 Neurologic Exam:  Mental status: awake and alert and oriented x4    Cranial Nerves:  II: Visual acuity not tested, visual field cut on left, denies new visual changes / diplopia  III, IV, VI: PERRL, 3 mm bilaterally, EOMI, no nystagmus noted  V: Facial sensation intact bilaterally to touch  VII: Face symmetric  VIII: Hearing intact bilaterally to spoken voice  IX: Palate movement equal bilaterally  XI: Shoulder shrug equal bilaterally  XII: Tongue midline      Musculoskeletal:   Gait: Not tested   Tone: normal  Sensory: intact to all extremities  Motor strength:    Right  Left    Right  Left    Deltoid  5 5  Hip Flex  5 5   Biceps  5 5  Knee Extensors  5 5   Triceps  5 5  Knee Flexors  5 5   Wrist Ext  5 5  Ankle Dorsiflex. 5 5   Wrist Flex  5 5  Ankle Plantarflex. 5 5   Handgrip  5 5  Ext Raymond Longus  5 5   Thumb Ext  5 5         Incision: intact, clean and dry      Respiratory:  Unlabored respiratory pattern    Abdomen:   Soft, ND       Cardiovascular:  Warm, well perfused    Assessment   Patient is a 48 yo F s/p Procedure(s) (LRB):  RIGHT PARIETAL CRANIOTOMY FOR STEREOTACTIC BIOPSY OF BRAIN MASS (Right) per Dr. Mabel Ross on 2/26/2020. Patient underwent a craniotomy with biopsy of parietal lesion for unspecified brain neoplasm with frozen pathology consistent with Glioma . Patient is On Decadron. 2/26 Brain MRI shown adjacent parenchymal vasogenic edema causing mass effect with mild splaying and effacement of the posterior horn/trigone of bilateral lateral ventricles. She is being treated for Cerebral edema with brain compression with Decadron.       Plan:  1. Neurologic exam frequency:q4h  2. Mobility:PT/OT today and see if she does better  3. Steroids: started decadron 4 q 6 will start s slow wean  4. Seizure Prophylaxis: keppra continue  5. DVT Prophylaxis: SCDs and heparin sq  6. GI Prophylaxis: protonix  7. Bowel Regimen: senna  8. Pain control:oxy and tylenol  9. Incisional Care: open to air and shower  Dispo Planning:poss today as long as she does better with PT  Patient was seen with Dr. Mabel Ross who agrees with above assessment and plan. Electronically signed by:  Iona Conroy, 2/28/2020 9:36 AM Neurosurgery Nurse Practitioner  [phone]

## 2020-06-01 ENCOUNTER — HOSPITAL ENCOUNTER (OUTPATIENT)
Dept: MRI IMAGING | Age: 55
Discharge: HOME | End: 2020-06-01
Payer: COMMERCIAL

## 2020-06-01 PROCEDURE — 70553 MRI BRAIN STEM W/O & W/DYE: CPT

## 2020-06-01 PROCEDURE — A9579 GAD-BASE MR CONTRAST NOS,1ML: HCPCS | Performed by: INTERNAL MEDICINE

## 2020-06-01 PROCEDURE — 6360000004 HC RX CONTRAST MEDICATION: Performed by: INTERNAL MEDICINE

## 2020-06-01 RX ADMIN — GADOTERIDOL 20 ML: 279.3 INJECTION, SOLUTION INTRAVENOUS at 12:30

## 2020-06-24 ENCOUNTER — APPOINTMENT (OUTPATIENT)
Dept: CT IMAGING | Age: 55
DRG: 054 | End: 2020-06-24
Payer: COMMERCIAL

## 2020-06-24 ENCOUNTER — HOSPITAL ENCOUNTER (INPATIENT)
Age: 55
LOS: 3 days | Discharge: HOME | DRG: 054 | End: 2020-06-27
Attending: EMERGENCY MEDICINE | Admitting: INTERNAL MEDICINE
Payer: COMMERCIAL

## 2020-06-24 ENCOUNTER — APPOINTMENT (OUTPATIENT)
Dept: GENERAL RADIOLOGY | Age: 55
DRG: 054 | End: 2020-06-24
Payer: COMMERCIAL

## 2020-06-24 DIAGNOSIS — E87.6 HYPOKALEMIA: ICD-10-CM

## 2020-06-24 DIAGNOSIS — G91.9 HYDROCEPHALUS, UNSPECIFIED TYPE (HCC): ICD-10-CM

## 2020-06-24 DIAGNOSIS — G93.89 BRAIN MASS: ICD-10-CM

## 2020-06-24 DIAGNOSIS — R41.82 ALTERED MENTAL STATUS, UNSPECIFIED ALTERED MENTAL STATUS TYPE: Primary | ICD-10-CM

## 2020-06-24 PROBLEM — I10 HTN (HYPERTENSION): Status: ACTIVE | Noted: 2020-06-24

## 2020-06-24 PROBLEM — K21.9 GERD (GASTROESOPHAGEAL REFLUX DISEASE): Status: ACTIVE | Noted: 2020-06-24

## 2020-06-24 LAB
A/G RATIO: 1.4 (ref 1.1–2.2)
ALBUMIN SERPL-MCNC: 3.9 G/DL (ref 3.4–5)
ALP BLD-CCNC: 55 U/L (ref 40–129)
ALT SERPL-CCNC: 33 U/L (ref 10–40)
ANION GAP SERPL CALCULATED.3IONS-SCNC: 13 MMOL/L (ref 3–16)
AST SERPL-CCNC: 24 U/L (ref 15–37)
BASOPHILS ABSOLUTE: 0 K/UL (ref 0–0.2)
BASOPHILS RELATIVE PERCENT: 0.4 %
BILIRUB SERPL-MCNC: 0.6 MG/DL (ref 0–1)
BILIRUBIN URINE: NEGATIVE
BLOOD, URINE: NEGATIVE
BUN BLDV-MCNC: 17 MG/DL (ref 7–20)
CALCIUM SERPL-MCNC: 9.3 MG/DL (ref 8.3–10.6)
CHLORIDE BLD-SCNC: 105 MMOL/L (ref 99–110)
CLARITY: ABNORMAL
CO2: 25 MMOL/L (ref 21–32)
COLOR: YELLOW
CREAT SERPL-MCNC: 0.6 MG/DL (ref 0.6–1.1)
EOSINOPHILS ABSOLUTE: 0.2 K/UL (ref 0–0.6)
EOSINOPHILS RELATIVE PERCENT: 3 %
EPITHELIAL CELLS, UA: 1 /HPF (ref 0–5)
GFR AFRICAN AMERICAN: >60
GFR NON-AFRICAN AMERICAN: >60
GLOBULIN: 2.7 G/DL
GLUCOSE BLD-MCNC: 116 MG/DL (ref 70–99)
GLUCOSE BLD-MCNC: 140 MG/DL (ref 70–99)
GLUCOSE BLD-MCNC: 193 MG/DL (ref 70–99)
GLUCOSE URINE: NEGATIVE MG/DL
HCT VFR BLD CALC: 36 % (ref 36–48)
HEMOGLOBIN: 12.7 G/DL (ref 12–16)
HYALINE CASTS: 1 /LPF (ref 0–8)
KEPPRA DOSE AMT: ABNORMAL
KEPPRA: <2 UG/ML (ref 6–46)
KETONES, URINE: NEGATIVE MG/DL
LEUKOCYTE ESTERASE, URINE: NEGATIVE
LIPASE: 94 U/L (ref 13–60)
LYMPHOCYTES ABSOLUTE: 0.6 K/UL (ref 1–5.1)
LYMPHOCYTES RELATIVE PERCENT: 12.1 %
MAGNESIUM: 2 MG/DL (ref 1.8–2.4)
MCH RBC QN AUTO: 38.9 PG (ref 26–34)
MCHC RBC AUTO-ENTMCNC: 35.4 G/DL (ref 31–36)
MCV RBC AUTO: 109.9 FL (ref 80–100)
MICROSCOPIC EXAMINATION: YES
MONOCYTES ABSOLUTE: 0.7 K/UL (ref 0–1.3)
MONOCYTES RELATIVE PERCENT: 12.7 %
NEUTROPHILS ABSOLUTE: 3.8 K/UL (ref 1.7–7.7)
NEUTROPHILS RELATIVE PERCENT: 71.8 %
NITRITE, URINE: NEGATIVE
PDW BLD-RTO: 20.8 % (ref 12.4–15.4)
PERFORMED ON: ABNORMAL
PERFORMED ON: ABNORMAL
PH UA: 7.5 (ref 5–8)
PLATELET # BLD: 175 K/UL (ref 135–450)
PMV BLD AUTO: 6.6 FL (ref 5–10.5)
POTASSIUM SERPL-SCNC: 2.9 MMOL/L (ref 3.5–5.1)
PROTEIN UA: NEGATIVE MG/DL
RBC # BLD: 3.27 M/UL (ref 4–5.2)
RBC UA: 1 /HPF (ref 0–4)
SODIUM BLD-SCNC: 143 MMOL/L (ref 136–145)
SPECIFIC GRAVITY UA: 1.01 (ref 1–1.03)
TOTAL PROTEIN: 6.6 G/DL (ref 6.4–8.2)
TROPONIN: <0.01 NG/ML
URINE REFLEX TO CULTURE: ABNORMAL
URINE TYPE: ABNORMAL
UROBILINOGEN, URINE: 1 E.U./DL
WBC # BLD: 5.3 K/UL (ref 4–11)
WBC UA: 1 /HPF (ref 0–5)

## 2020-06-24 PROCEDURE — 94760 N-INVAS EAR/PLS OXIMETRY 1: CPT

## 2020-06-24 PROCEDURE — 83690 ASSAY OF LIPASE: CPT

## 2020-06-24 PROCEDURE — 6370000000 HC RX 637 (ALT 250 FOR IP): Performed by: INTERNAL MEDICINE

## 2020-06-24 PROCEDURE — 80053 COMPREHEN METABOLIC PANEL: CPT

## 2020-06-24 PROCEDURE — 70450 CT HEAD/BRAIN W/O DYE: CPT

## 2020-06-24 PROCEDURE — 84484 ASSAY OF TROPONIN QUANT: CPT

## 2020-06-24 PROCEDURE — 6360000002 HC RX W HCPCS: Performed by: PHYSICIAN ASSISTANT

## 2020-06-24 PROCEDURE — 93005 ELECTROCARDIOGRAM TRACING: CPT | Performed by: PHYSICIAN ASSISTANT

## 2020-06-24 PROCEDURE — 6360000002 HC RX W HCPCS: Performed by: INTERNAL MEDICINE

## 2020-06-24 PROCEDURE — 1200000000 HC SEMI PRIVATE

## 2020-06-24 PROCEDURE — 2580000003 HC RX 258: Performed by: INTERNAL MEDICINE

## 2020-06-24 PROCEDURE — 99285 EMERGENCY DEPT VISIT HI MDM: CPT

## 2020-06-24 PROCEDURE — 96361 HYDRATE IV INFUSION ADD-ON: CPT

## 2020-06-24 PROCEDURE — 71045 X-RAY EXAM CHEST 1 VIEW: CPT

## 2020-06-24 PROCEDURE — 80177 DRUG SCRN QUAN LEVETIRACETAM: CPT

## 2020-06-24 PROCEDURE — 6360000002 HC RX W HCPCS: Performed by: EMERGENCY MEDICINE

## 2020-06-24 PROCEDURE — 96375 TX/PRO/DX INJ NEW DRUG ADDON: CPT

## 2020-06-24 PROCEDURE — 83735 ASSAY OF MAGNESIUM: CPT

## 2020-06-24 PROCEDURE — 36415 COLL VENOUS BLD VENIPUNCTURE: CPT

## 2020-06-24 PROCEDURE — 81001 URINALYSIS AUTO W/SCOPE: CPT

## 2020-06-24 PROCEDURE — 2580000003 HC RX 258: Performed by: PHYSICIAN ASSISTANT

## 2020-06-24 PROCEDURE — 96365 THER/PROPH/DIAG IV INF INIT: CPT

## 2020-06-24 PROCEDURE — 83036 HEMOGLOBIN GLYCOSYLATED A1C: CPT

## 2020-06-24 PROCEDURE — 85025 COMPLETE CBC W/AUTO DIFF WBC: CPT

## 2020-06-24 RX ORDER — HYDRALAZINE HYDROCHLORIDE 20 MG/ML
10 INJECTION INTRAMUSCULAR; INTRAVENOUS EVERY 6 HOURS PRN
Status: DISCONTINUED | OUTPATIENT
Start: 2020-06-24 | End: 2020-06-27 | Stop reason: HOSPADM

## 2020-06-24 RX ORDER — POTASSIUM CHLORIDE 1500 MG/1
20 TABLET, FILM COATED, EXTENDED RELEASE ORAL DAILY
COMMUNITY
Start: 2020-06-01 | End: 2020-06-24 | Stop reason: ALTCHOICE

## 2020-06-24 RX ORDER — ACETAMINOPHEN 650 MG/1
650 SUPPOSITORY RECTAL EVERY 6 HOURS PRN
Status: DISCONTINUED | OUTPATIENT
Start: 2020-06-24 | End: 2020-06-27 | Stop reason: HOSPADM

## 2020-06-24 RX ORDER — DOCUSATE SODIUM 100 MG/1
100 CAPSULE, LIQUID FILLED ORAL DAILY
COMMUNITY
End: 2021-07-27

## 2020-06-24 RX ORDER — OXYCODONE HYDROCHLORIDE 5 MG/1
1 CAPSULE ORAL EVERY 6 HOURS PRN
COMMUNITY
End: 2020-06-24 | Stop reason: ALTCHOICE

## 2020-06-24 RX ORDER — POTASSIUM CHLORIDE 7.45 MG/ML
10 INJECTION INTRAVENOUS
Status: COMPLETED | OUTPATIENT
Start: 2020-06-24 | End: 2020-06-24

## 2020-06-24 RX ORDER — LEVETIRACETAM 10 MG/ML
1000 INJECTION INTRAVASCULAR ONCE
Status: COMPLETED | OUTPATIENT
Start: 2020-06-24 | End: 2020-06-24

## 2020-06-24 RX ORDER — NICOTINE POLACRILEX 4 MG
15 LOZENGE BUCCAL PRN
Status: DISCONTINUED | OUTPATIENT
Start: 2020-06-24 | End: 2020-06-27 | Stop reason: HOSPADM

## 2020-06-24 RX ORDER — 0.9 % SODIUM CHLORIDE 0.9 %
500 INTRAVENOUS SOLUTION INTRAVENOUS PRN
Status: DISCONTINUED | OUTPATIENT
Start: 2020-06-24 | End: 2020-06-27 | Stop reason: HOSPADM

## 2020-06-24 RX ORDER — ONDANSETRON 2 MG/ML
4 INJECTION INTRAMUSCULAR; INTRAVENOUS EVERY 6 HOURS PRN
Status: DISCONTINUED | OUTPATIENT
Start: 2020-06-24 | End: 2020-06-27 | Stop reason: HOSPADM

## 2020-06-24 RX ORDER — SODIUM CHLORIDE 0.9 % (FLUSH) 0.9 %
10 SYRINGE (ML) INJECTION EVERY 12 HOURS SCHEDULED
Status: DISCONTINUED | OUTPATIENT
Start: 2020-06-24 | End: 2020-06-27 | Stop reason: HOSPADM

## 2020-06-24 RX ORDER — ACETAMINOPHEN 325 MG/1
650 TABLET ORAL EVERY 6 HOURS PRN
Status: DISCONTINUED | OUTPATIENT
Start: 2020-06-24 | End: 2020-06-27 | Stop reason: HOSPADM

## 2020-06-24 RX ORDER — POTASSIUM CHLORIDE 7.45 MG/ML
10 INJECTION INTRAVENOUS PRN
Status: DISCONTINUED | OUTPATIENT
Start: 2020-06-24 | End: 2020-06-24 | Stop reason: SDUPTHER

## 2020-06-24 RX ORDER — HYDROMORPHONE HYDROCHLORIDE 1 MG/ML
0.5 INJECTION, SOLUTION INTRAMUSCULAR; INTRAVENOUS; SUBCUTANEOUS ONCE
Status: COMPLETED | OUTPATIENT
Start: 2020-06-24 | End: 2020-06-24

## 2020-06-24 RX ORDER — DEXTROSE MONOHYDRATE 25 G/50ML
12.5 INJECTION, SOLUTION INTRAVENOUS PRN
Status: DISCONTINUED | OUTPATIENT
Start: 2020-06-24 | End: 2020-06-27 | Stop reason: HOSPADM

## 2020-06-24 RX ORDER — DEXAMETHASONE 4 MG/1
4 TABLET ORAL EVERY 12 HOURS SCHEDULED
Status: DISCONTINUED | OUTPATIENT
Start: 2020-06-24 | End: 2020-06-27 | Stop reason: HOSPADM

## 2020-06-24 RX ORDER — SODIUM CHLORIDE 9 MG/ML
INJECTION, SOLUTION INTRAVENOUS CONTINUOUS
Status: DISCONTINUED | OUTPATIENT
Start: 2020-06-24 | End: 2020-06-27 | Stop reason: HOSPADM

## 2020-06-24 RX ORDER — LEVETIRACETAM 1000 MG/1
1 TABLET ORAL
COMMUNITY
Start: 2020-02-21 | End: 2020-06-24 | Stop reason: SDUPTHER

## 2020-06-24 RX ORDER — DEXAMETHASONE 2 MG/1
2 TABLET ORAL EVERY EVENING
Status: ON HOLD | COMMUNITY
End: 2020-06-27 | Stop reason: HOSPADM

## 2020-06-24 RX ORDER — INSULIN LISPRO 100 [IU]/ML
0-6 INJECTION, SOLUTION INTRAVENOUS; SUBCUTANEOUS NIGHTLY
Status: DISCONTINUED | OUTPATIENT
Start: 2020-06-24 | End: 2020-06-27 | Stop reason: HOSPADM

## 2020-06-24 RX ORDER — DEXAMETHASONE SODIUM PHOSPHATE 10 MG/ML
10 INJECTION, SOLUTION INTRAMUSCULAR; INTRAVENOUS ONCE
Status: COMPLETED | OUTPATIENT
Start: 2020-06-24 | End: 2020-06-24

## 2020-06-24 RX ORDER — DEXAMETHASONE 4 MG/1
4 TABLET ORAL
Status: ON HOLD | COMMUNITY
Start: 2020-06-09 | End: 2020-06-27 | Stop reason: SDUPTHER

## 2020-06-24 RX ORDER — ONDANSETRON 2 MG/ML
4 INJECTION INTRAMUSCULAR; INTRAVENOUS ONCE
Status: COMPLETED | OUTPATIENT
Start: 2020-06-24 | End: 2020-06-24

## 2020-06-24 RX ORDER — FAMOTIDINE 20 MG/1
20 TABLET, FILM COATED ORAL 2 TIMES DAILY PRN
Status: DISCONTINUED | OUTPATIENT
Start: 2020-06-24 | End: 2020-06-27 | Stop reason: HOSPADM

## 2020-06-24 RX ORDER — VENLAFAXINE HYDROCHLORIDE 75 MG/1
75 CAPSULE, EXTENDED RELEASE ORAL DAILY
Status: ON HOLD | COMMUNITY
End: 2021-07-28 | Stop reason: HOSPADM

## 2020-06-24 RX ORDER — POTASSIUM CHLORIDE 7.45 MG/ML
10 INJECTION INTRAVENOUS PRN
Status: DISCONTINUED | OUTPATIENT
Start: 2020-06-24 | End: 2020-06-27 | Stop reason: HOSPADM

## 2020-06-24 RX ORDER — DEXTROSE MONOHYDRATE 50 MG/ML
100 INJECTION, SOLUTION INTRAVENOUS PRN
Status: DISCONTINUED | OUTPATIENT
Start: 2020-06-24 | End: 2020-06-27 | Stop reason: HOSPADM

## 2020-06-24 RX ORDER — POTASSIUM CHLORIDE 20 MEQ/1
40 TABLET, EXTENDED RELEASE ORAL PRN
Status: DISCONTINUED | OUTPATIENT
Start: 2020-06-24 | End: 2020-06-24 | Stop reason: SDUPTHER

## 2020-06-24 RX ORDER — PROMETHAZINE HYDROCHLORIDE 25 MG/1
12.5 TABLET ORAL EVERY 6 HOURS PRN
Status: DISCONTINUED | OUTPATIENT
Start: 2020-06-24 | End: 2020-06-27 | Stop reason: HOSPADM

## 2020-06-24 RX ORDER — INSULIN LISPRO 100 [IU]/ML
0-12 INJECTION, SOLUTION INTRAVENOUS; SUBCUTANEOUS
Status: DISCONTINUED | OUTPATIENT
Start: 2020-06-24 | End: 2020-06-27 | Stop reason: HOSPADM

## 2020-06-24 RX ORDER — POTASSIUM CHLORIDE 20 MEQ/1
40 TABLET, EXTENDED RELEASE ORAL PRN
Status: DISCONTINUED | OUTPATIENT
Start: 2020-06-24 | End: 2020-06-27 | Stop reason: HOSPADM

## 2020-06-24 RX ORDER — SODIUM CHLORIDE 0.9 % (FLUSH) 0.9 %
10 SYRINGE (ML) INJECTION PRN
Status: DISCONTINUED | OUTPATIENT
Start: 2020-06-24 | End: 2020-06-27 | Stop reason: HOSPADM

## 2020-06-24 RX ORDER — 0.9 % SODIUM CHLORIDE 0.9 %
1000 INTRAVENOUS SOLUTION INTRAVENOUS ONCE
Status: COMPLETED | OUTPATIENT
Start: 2020-06-24 | End: 2020-06-24

## 2020-06-24 RX ORDER — VENLAFAXINE 75 MG/1
TABLET ORAL
COMMUNITY
Start: 2020-06-05 | End: 2020-06-24 | Stop reason: SDUPTHER

## 2020-06-24 RX ADMIN — POTASSIUM CHLORIDE 10 MEQ: 7.46 INJECTION, SOLUTION INTRAVENOUS at 22:47

## 2020-06-24 RX ADMIN — ONDANSETRON 4 MG: 2 INJECTION INTRAMUSCULAR; INTRAVENOUS at 15:12

## 2020-06-24 RX ADMIN — SODIUM CHLORIDE 1000 ML: 9 INJECTION, SOLUTION INTRAVENOUS at 15:11

## 2020-06-24 RX ADMIN — DEXAMETHASONE SODIUM PHOSPHATE 10 MG: 10 INJECTION, SOLUTION INTRAMUSCULAR; INTRAVENOUS at 16:27

## 2020-06-24 RX ADMIN — POTASSIUM CHLORIDE 10 MEQ: 7.46 INJECTION, SOLUTION INTRAVENOUS at 20:45

## 2020-06-24 RX ADMIN — POTASSIUM CHLORIDE 10 MEQ: 7.46 INJECTION, SOLUTION INTRAVENOUS at 17:25

## 2020-06-24 RX ADMIN — ONDANSETRON 4 MG: 2 INJECTION INTRAMUSCULAR; INTRAVENOUS at 17:53

## 2020-06-24 RX ADMIN — INSULIN LISPRO 2 UNITS: 100 INJECTION, SOLUTION INTRAVENOUS; SUBCUTANEOUS at 21:30

## 2020-06-24 RX ADMIN — Medication 10 ML: at 20:49

## 2020-06-24 RX ADMIN — SODIUM CHLORIDE: 9 INJECTION, SOLUTION INTRAVENOUS at 20:06

## 2020-06-24 RX ADMIN — LEVETIRACETAM 1000 MG: 10 INJECTION INTRAVENOUS at 20:01

## 2020-06-24 RX ADMIN — ENOXAPARIN SODIUM 40 MG: 40 INJECTION SUBCUTANEOUS at 20:45

## 2020-06-24 RX ADMIN — POTASSIUM CHLORIDE 10 MEQ: 7.46 INJECTION, SOLUTION INTRAVENOUS at 16:27

## 2020-06-24 RX ADMIN — HYDROMORPHONE HYDROCHLORIDE: 1 INJECTION, SOLUTION INTRAMUSCULAR; INTRAVENOUS; SUBCUTANEOUS at 17:53

## 2020-06-24 RX ADMIN — DEXAMETHASONE 4 MG: 4 TABLET ORAL at 20:45

## 2020-06-24 RX ADMIN — INSULIN LISPRO 1 UNITS: 100 INJECTION, SOLUTION INTRAVENOUS; SUBCUTANEOUS at 20:30

## 2020-06-24 ASSESSMENT — ENCOUNTER SYMPTOMS
NAUSEA: 1
STRIDOR: 0
COLOR CHANGE: 0
DIARRHEA: 0
EYE PAIN: 0
EYE DISCHARGE: 0
COUGH: 0
WHEEZING: 0
VOMITING: 1
EYE REDNESS: 0
EYE ITCHING: 0
PHOTOPHOBIA: 0
BACK PAIN: 0
ABDOMINAL PAIN: 0
SHORTNESS OF BREATH: 0

## 2020-06-24 NOTE — ED PROVIDER NOTES
Respiratory: Negative for cough, shortness of breath, wheezing and stridor. Cardiovascular: Negative for chest pain, palpitations and leg swelling. Gastrointestinal: Positive for nausea and vomiting. Negative for abdominal pain and diarrhea. Endocrine: Negative. Genitourinary: Negative. Musculoskeletal: Negative for back pain, neck pain and neck stiffness. Skin: Negative for color change, pallor, rash and wound. Neurological: Positive for tremors and headaches. Negative for dizziness, seizures, syncope, facial asymmetry, speech difficulty, weakness, light-headedness and numbness. Psychiatric/Behavioral: Positive for confusion. All other systems reviewed and are negative. Positives and Pertinent negatives as per HPI. Except as noted above in the ROS, all other systems were reviewed and negative. PAST MEDICAL HISTORY     Past Medical History:   Diagnosis Date    Brain tumor [facility])     recent memory loss, confusion, persistant headache, nausea, dizzy    History of chemotherapy     History of radiation therapy     Hypertension          SURGICAL HISTORY     Past Surgical History:   Procedure Laterality Date    CRANIOTOMY Right 2/26/2020    RIGHT PARIETAL CRANIOTOMY FOR STEREOTACTIC BIOPSY OF BRAIN MASS performed by Chelsie Khan. Tiburcio Brown MD at [facility]       Previous Medications    BENAZEPRIL-HYDROCHLORTHIAZIDE (LOTENSIN HCT) 20-25 MG PER TABLET    Take 1 tablet by mouth    DEXAMETHASONE (DECADRON) 4 MG TABLET        LEVETIRACETAM (KEPPRA) 1000 MG TABLET    Take 1 tablet by mouth    LEVETIRACETAM (KEPPRA) 500 MG TABLET    Take 1,000 mg by mouth 2 times daily     ONDANSETRON (ZOFRAN) 4 MG TABLET    ZOFRAN 4 MG TABS    OXYCODONE 5 MG CAPSULE    1 tablet every 6 hours as needed.     PANTOPRAZOLE (PROTONIX) 40 MG TABLET    Take 1 tablet by mouth every morning (before breakfast)    SENNOSIDES-DOCUSATE SODIUM (SENOKOT-S) 8.6-50 MG TABLET    Take 2 tablets by mouth 2 times daily    SENNOSIDES-DOCUSATE SODIUM 8.6-50 MG CAPS    2 tablets    VENLAFAXINE (EFFEXOR) 75 MG TABLET    Take 75 mg by mouth         ALLERGIES     Patient has no known allergies. FAMILYHISTORY     No family history on file. SOCIAL HISTORY       Social History     Tobacco Use    Smoking status: Never Smoker    Smokeless tobacco: Never Used   Substance Use Topics    Alcohol use: Not Currently     Comment: rarely    Drug use: Never       SCREENINGS             PHYSICAL EXAM    (up to 7 for level 4, 8 or more for level 5)     ED Triage Vitals [06/24/20 1304]   BP Temp Temp Source Pulse Resp SpO2 Height Weight   116/72 97.6 °F (36.4 °C) Temporal 90 16 96 % 5' 2\" (1.575 m) 234 lb (106.1 kg)       Physical Exam  Vitals signs and nursing note reviewed. Constitutional:       Appearance: Normal appearance. She is well-developed. She is not toxic-appearing or diaphoretic. HENT:      Head: Normocephalic and atraumatic. Right Ear: Tympanic membrane, ear canal and external ear normal.      Left Ear: Tympanic membrane, ear canal and external ear normal.      Nose: Nose normal.      Mouth/Throat:      Mouth: Mucous membranes are moist.      Pharynx: Oropharynx is clear. Eyes:      General: No scleral icterus. Right eye: No discharge. Left eye: No discharge. Extraocular Movements: Extraocular movements intact. Conjunctiva/sclera: Conjunctivae normal.      Pupils: Pupils are equal, round, and reactive to light. Neck:      Musculoskeletal: Normal range of motion. Cardiovascular:      Rate and Rhythm: Normal rate. Pulmonary:      Effort: Pulmonary effort is normal.      Breath sounds: Normal breath sounds. Abdominal:      General: Bowel sounds are normal. There is no distension. Palpations: Abdomen is soft. Tenderness: There is no abdominal tenderness.  There is no right CVA tenderness or left CVA tenderness. Musculoskeletal: Normal range of motion. Skin:     General: Skin is warm and dry. Capillary Refill: Capillary refill takes less than 2 seconds. Coloration: Skin is not jaundiced or pale. Findings: No bruising, erythema, lesion or rash. Neurological:      Mental Status: She is alert. Cranial Nerves: No cranial nerve deficit (II-XII intact). Comments: Alert and oriented to person but not to place or time. Slow to follow commands and answer questions however patient is able to do so with symmetric strength. No pronator drift, facial droop or slurred speech.    (patient states that patient seems more alert now than earlier today and yesterday)   Psychiatric:         Attention and Perception: Perception normal. She is inattentive. Mood and Affect: Mood normal. Affect is flat. Speech: Delayed: slowed. Behavior: Behavior normal. Behavior is cooperative. Thought Content: Thought content normal.         Cognition and Memory: Memory is impaired.          DIAGNOSTIC RESULTS   LABS:    Labs Reviewed   CBC WITH AUTO DIFFERENTIAL - Abnormal; Notable for the following components:       Result Value    RBC 3.27 (*)     .9 (*)     MCH 38.9 (*)     RDW 20.8 (*)     Lymphocytes Absolute 0.6 (*)     All other components within normal limits    Narrative:     Performed at:  [facility] [address]   Phone [phone]   COMPREHENSIVE METABOLIC PANEL - Abnormal; Notable for the following components:    Potassium 2.9 (*)     Glucose 116 (*)     All other components within normal limits    Narrative:     CALL  [facility] tel. [phone],  Chemistry results called to and read back by Blake Aragon, 06/24/2020  15:37, by [facility]  Performed at:  [facility]  [address],  [place], [address]   Phone [phone]   LIPASE - Abnormal; Notable for the following components:    Lipase 94.0 (*)     All other components within normal limits    Narrative:     Jay Hem  [facility] tel. [phone],  Chemistry results called to and read back by Suzy King, 06/24/2020  15:37, by [facility]  Performed at:  [facility]  [address] [facility],  [place], [address]   Phone [phone]   TROPONIN    Narrative:     Jay Hem  [facility] tel. [phone],  Chemistry results called to and read back by Suzy King, 06/24/2020  15:37, by [facility]  Performed at:  [facility]  [address] [facility],  [place], [address]   Phone [phone]   MAGNESIUM    Narrative:     Anisha Wolf tel. [phone],  Chemistry results called to and read back by Suzy King, 06/24/2020  15:37, by [facility]  Performed at:  [facility]  [address] [facility], [address]   Phone [phone]   LEVETIRACETAM LEVEL    Narrative:     Performed at:  [facility]  [address]   Phone [phone]   URINE RT REFLEX TO CULTURE       All other labs were within normal range or not returned as of this dictation. EKG: All EKG's are interpreted by the Emergency Department Physician in the absence of a cardiologist.  Please see their note for interpretation of EKG. RADIOLOGY:   Non-plain film images such as CT, Ultrasound and MRI are read by the radiologist. Plain radiographic images are visualized and preliminarily interpreted by the ED Provider with the below findings:        Interpretation per the Radiologist below, if available at the time of this note:    XR CHEST PORTABLE   Final Result   No radiographic evidence of acute pulmonary disease. CT HEAD WO CONTRAST   Final Result   Redemonstration of a large heterogeneous mass centered on the splenium of the   corpus callosum. The absolute size that appears roughly unchanged.       The only notable change between the current examination on MRI from   06/01/2020 is increased hydrocephalus of right temporal horn, which suggests   increasing obstruction of that ventricle. PROCEDURES   Unless otherwise noted below, none     Procedures    CRITICAL CARE TIME   N/A    CONSULTS:  Marisol, pharmacist, suggests 1 g Keppra IV. Maria Guadalupe Giles will admit ([id number])    EMERGENCY DEPARTMENT COURSE and DIFFERENTIAL DIAGNOSIS/MDM:   Vitals:    Vitals:    06/24/20 1304   BP: 116/72   Pulse: 90   Resp: 16   Temp: 97.6 °F (36.4 °C)   TempSrc: Temporal   SpO2: 96%   Weight: 234 lb (106.1 kg)   Height: 5' 2\" (1.575 m)       Patient was given the following medications:  Medications   potassium chloride 10 mEq/100 mL IVPB (Peripheral Line) (has no administration in time range)   dexamethasone (PF) (DECADRON) injection 10 mg (has no administration in time range)   levetiracetam (KEPPRA) 1000 mg/100 mL IVPB (has no administration in time range)   ondansetron (ZOFRAN) injection 4 mg (4 mg Intravenous Given 6/24/20 1512)   0.9 % sodium chloride bolus (1,000 mLs Intravenous New Bag 6/24/20 1511)       This patient presents to the emergency department with complaints of altered mental status, transient headache, nausea vomiting. Abdomen is soft and nontender in all 4 quadrants without pulsatile mass or CVA tenderness. CT scan shows persistent brain mass however there is notable change between the current examination on MRI from 6/1/2020 showing increased hydrocephalus of right temporal horn suggesting increase obstruction of that ventricle. Therefore, we did order Decadron IV and give additional Keppra IV for this patient. Abdomen is soft and nontender.  My suspicion is low for acute surgical abdomen, obstruction, perforation, abscess, mesenteric ischemia, AAA, dissection, cholecystitis, cholangitis, severe pancreatitis, appendicitis, C. diff colitis, diverticulitis, volvulus, incarcerated hernia, necrotizing fasciitis, TOA, ovarian torsion, PID, ectopic pregnancy, milad dali Junito syndrome, pyelonephritis, perinephric abscess, kidney stone, urosepsis, fistula, intussusception, ACS, PE, TAD, pneumonia, covid19, acute stroke, meningitis, encephalitis, or other concerning pathology. Patient and family understand and agree with plan for admission. FINAL IMPRESSION      1. Altered mental status, unspecified altered mental status type    2. Hypokalemia    3. Hydrocephalus, unspecified type ([facility] [address]    4. Brain mass          DISPOSITION/PLAN   DISPOSITION        PATIENT REFERREDTO:  No follow-up provider specified.     DISCHARGE MEDICATIONS:  New Prescriptions    No medications on file       DISCONTINUED MEDICATIONS:  Discontinued Medications    No medications on file              (Please note that portions of this note were completed with a voice recognition program.  Efforts were made to edit the dictations but occasionally words are mis-transcribed.)    Ramy Floyd PA-C (electronically signed)           Ramy Floyd PA-C  06/24/20 1615

## 2020-06-24 NOTE — ED NOTES
Pharmacy Medication History Note      List of current medications patient is taking is complete. Source of information: Family    Changes made to medication list:  Medications flagged for removal (include reason, ex. noncompliance):  N/A      Medications removed (include reason, ex. therapy complete or physician discontinued):  Oxycodone- therapy complete  Senna- therapy complete  Ondansetron- therapy complete  Venlafaxine- dose adjustment    Medications added/doses adjusted:  Venlafaxine ER 75mg- QD    Other notes (ex. Recent course of antibiotics, Coumadin dosing):  Denies use of other OTC or herbal medications. Last dose times updated. Sharon Null [facility]    No current facility-administered medications on file prior to encounter. Current Outpatient Medications on File Prior to Encounter   Medication Sig Dispense Refill    dexamethasone (DECADRON) 4 MG tablet Take 4 mg by mouth daily (with breakfast)       dexamethasone (DECADRON) 2 MG tablet Take 2 mg by mouth every evening      docusate sodium (COLACE) 100 MG capsule Take 100 mg by mouth daily      venlafaxine (EFFEXOR XR) 75 MG extended release capsule Take 75 mg by mouth daily      pantoprazole (PROTONIX) 40 MG tablet Take 1 tablet by mouth every morning (before breakfast) 30 tablet 3    benazepril-hydrochlorthiazide (LOTENSIN HCT) 20-25 MG per tablet Take 1 tablet by mouth daily       levETIRAcetam (KEPPRA) 500 MG tablet Take 1,000 mg by mouth 2 times daily       [DISCONTINUED] oxyCODONE 5 MG capsule 1 tablet every 6 hours as needed.       [DISCONTINUED] Sennosides-Docusate Sodium 8.6-50 MG CAPS 2 tablets      [DISCONTINUED] levETIRAcetam (KEPPRA) 1000 MG tablet Take 1 tablet by mouth      [DISCONTINUED] venlafaxine (EFFEXOR) 75 MG tablet       [DISCONTINUED] potassium chloride (KLOR-CON M) 20 MEQ TBCR extended release tablet Take 20 mEq by mouth daily       [DISCONTINUED] sennosides-docusate sodium (SENOKOT-S) 8.6-50 MG tablet Take 2 tablets by mouth 2 times daily      [DISCONTINUED] ondansetron (ZOFRAN) 4 MG tablet ZOFRAN 4 MG TABS      [DISCONTINUED] venlafaxine (EFFEXOR) 75 MG tablet Take 75 mg by mouth daily

## 2020-06-24 NOTE — ED TRIAGE NOTES
Patient poor historian on home medications, allergies, safety/security questions and medical/surgical history. pts  at side.

## 2020-06-24 NOTE — ED NOTES
Patient presents today with AMS that began yesterday. Patient has known brain tumor, has finished radiation and was doing chemotherapy.  states pt has been increasingly confused since yesterday, was unable to find the handle to flush the toilet, got lost in their house. Pt is poor historian, will answer questions occasionally and other times has a glazed over look on her face.  also states pt is having increasing difficulty ambulating. Actively vomiting when brought back to room, denies c/o nausea now.  states nausea and vomiting increases when pt is up and moving.       Antony Jeffries, LEEANNE  06/24/20 8318

## 2020-06-24 NOTE — H&P
Problems    Diagnosis    Hydrocephalus ([facility] [address] [G91.9]    Altered mental state [R41.82]    Hypokalemia [E87.6]    GERD (gastroesophageal reflux disease) [K21.9]    HTN (hypertension) [I10]    Brain mass [G93.89]         Review of Systems: (1 system for EPF, 2-9 for detailed, 10+ for comprehensive)  Review of Systems   Constitutional: Negative for chills and fever. HENT: Negative for rhinorrhea and sore throat. Eyes: Negative for pain and redness. Respiratory: Negative for cough and shortness of breath. Cardiovascular: Negative for chest pain and leg swelling. Gastrointestinal: Positive for nausea and vomiting. Endocrine: Negative for cold intolerance and heat intolerance. Genitourinary: Negative for frequency and urgency. Musculoskeletal: Negative for myalgias. Allergic/Immunologic: Negative for food allergies. Neurological: Positive for seizures. Hematological: Does not bruise/bleed easily. Psychiatric/Behavioral: Positive for behavioral problems and confusion. Past Medical History:   Past Medical History:   Diagnosis Date    Brain tumor [facility])     recent memory loss, confusion, persistant headache, nausea, dizzy    History of chemotherapy     History of radiation therapy     Hypertension        Past Surgical History:   Past Surgical History:   Procedure Laterality Date    CRANIOTOMY Right 2/26/2020    RIGHT PARIETAL CRANIOTOMY FOR STEREOTACTIC BIOPSY OF BRAIN MASS performed by Marely Berry. Jorge Luis Cano MD at [facility] the [facility] CYST EXCISION         Social History:   Social History     Tobacco History     Smoking Status  Never Smoker    Smokeless Tobacco Use  Never Used          Alcohol History     Alcohol Use Status  Not Currently Comment  rarely          Drug Use     Drug Use Status  Never          Sexual Activity     Sexually Active  Not Asked                Fam History: No family history on file.     PFSH: The above PMHx, PSHx, SocHx, FamHx has been reviewed by myself. (1 area for detailed, 2-3 for comprehensive)      Code Status: Full Code    Meds - following list of home medications fromelectronic chart has been reviewed by myself  Prior to Admission medications    Medication Sig Start Date End Date Taking?  Authorizing Provider   dexamethasone (DECADRON) 4 MG tablet Take 4 mg by mouth daily (with breakfast)  6/9/20  Yes Historical Provider, MD   dexamethasone (DECADRON) 2 MG tablet Take 2 mg by mouth every evening   Yes Historical Provider, MD   docusate sodium (COLACE) 100 MG capsule Take 100 mg by mouth daily   Yes Historical Provider, MD   venlafaxine (EFFEXOR XR) 75 MG extended release capsule Take 75 mg by mouth daily   Yes Historical Provider, MD   pantoprazole (PROTONIX) 40 MG tablet Take 1 tablet by mouth every morning (before breakfast) 2/29/20  Yes JEN Hawkins - CNP   benazepril-hydrochlorthiazide (LOTENSIN HCT) 20-25 MG per tablet Take 1 tablet by mouth daily  6/12/17  Yes Historical Provider, MD   levETIRAcetam (KEPPRA) 500 MG tablet Take 1,000 mg by mouth 2 times daily  2/21/20  Yes Historical Provider, MD         No Known Allergies          EXAM: (2-7 system for EPF/Detailed, ?8 for Comprehensive)  /75   Pulse 70   Temp 98.4 °F (36.9 °C) (Oral)   Resp 16   Ht 5' 2\" (1.575 m)   Wt 228 lb 3.2 oz (103.5 kg)   SpO2 98%   BMI 41.74 kg/m²   Constitutional: vitals as above:   Head: Normocephalic, without obvious abnormality, atraumatic  Eyes:lids and lashes normal, conjunctivae and sclerae normal and pupils equal, round, reactive to light and accomodation  EMNT: external ears normal, lips normal  Neck: no adenopathy, supple, symmetrical, trachea midline and thyroid not enlarged, symmetric, no tenderness/mass/nodules   Respiratory: clear to auscultation without wheezes or rales  Cardiovascular: normal rate, regular rhythm, normal S1 and S2 and no gallops  Gastrointestinal: soft, non-tender, non-distended, normal bowel sounds, no masses or organomegaly  Lymphatic:   Extremities: no edema, no clubbing  Skin:No rashes or nodules noted.   Neurologic:    LABS:  Labs Reviewed   CBC WITH AUTO DIFFERENTIAL - Abnormal; Notable for the following components:       Result Value    RBC 3.27 (*)     .9 (*)     MCH 38.9 (*)     RDW 20.8 (*)     Lymphocytes Absolute 0.6 (*)     All other components within normal limits    Narrative:     Performed at:  [facility] [address], [address]   Phone [phone]   COMPREHENSIVE METABOLIC PANEL - Abnormal; Notable for the following components:    Potassium 2.9 (*)     Glucose 116 (*)     All other components within normal limits    Narrative:     EMA  [place]  SFERF tel. [phone],  Chemistry results called to and read back by [facility], 06/24/2020  15:37, by [facility]  Performed at:  [facility] [address], [address]   Phone [phone]   LIPASE - Abnormal; Notable for the following components:    Lipase 94.0 (*)     All other components within normal limits    Narrative:     Kalani Salguerocher  SFERF tel. [phone],  Chemistry results called to and read back by [facility], 06/24/2020  15:37, by [facility]  Performed at:  [facility] [address], [address]   Phone [phone]   URINE RT REFLEX TO CULTURE - Abnormal; Notable for the following components:    Clarity, UA CLOUDY (*)     All other components within normal limits    Narrative:     Performed at:  [facility] [address] [facility] [place],  [place], [address]   Phone [phone]   LEVETIRACETAM LEVEL - Abnormal; Notable for the following components:    Levetiracetam Lvl <2.0 (*)     All other components within normal limits    Narrative:     Performed at:  [place] [address]   Phone [phone] TROPONIN    Narrative:     CALL  [place]  SFERF tel. [phone],  Chemistry results called to and read back by Fadia Oswaldo, 06/24/2020  15:37, by [facility]  Performed at:  [facility]  [address] [facility] [facility], [facility] [place]   Phone [phone]   MAGNESIUM    Narrative:     Criss Barr  [facility] tel. [phone],  Chemistry results called to and read back by Fadia Chacon, 06/24/2020  15:37, by [facility]  Performed at:  [facility]  [address] [facility] [facility],  [place], [address]   Phone [phone]   MICROSCOPIC URINALYSIS    Narrative:     Performed at:  [facility]  [address] [facility] [address] [facility], [address] [place]   Phone [phone]   COMPREHENSIVE METABOLIC PANEL W/ REFLEX TO MG FOR LOW K   CBC WITH AUTO DIFFERENTIAL   BASIC METABOLIC PANEL   HEMOGLOBIN A1C   POCT GLUCOSE   POCT GLUCOSE         IMAGING:  Imaging results from the ER have been reviewed in the computerized chart. Ct Head Wo Contrast    Result Date: 6/24/2020  EXAMINATION: CT OF THE HEAD WITHOUT CONTRAST  6/24/2020 12:19 pm TECHNIQUE: CT of the head was performed without the administration of intravenous contrast. Dose modulation, iterative reconstruction, and/or weight based adjustment of the mA/kV was utilized to reduce the radiation dose to as low as reasonably achievable. COMPARISON: 02/26/2020 Brain MRI, 06/01/2020 HISTORY: ORDERING SYSTEM PROVIDED HISTORY: ams TECHNOLOGIST PROVIDED HISTORY: Reason for exam:->ams Has a \"code stroke\" or \"stroke alert\" been called? ->No Is the patient pregnant?->No Reason for Exam: ams Acuity: Unknown Type of Exam: Unknown FINDINGS: BRAIN/VENTRICLES: There is redemonstration of a heterogeneous mass which is again noted be centered within splenium of the corpus callosum, with extension to both cerebral hemispheres. The size that appears basically unchanged when compared to the MRI performed 06/01/2020, measuring roughly 6.7 cm maximally. Multiple internal calcifications are again identified. There is degenerative edema is identified surrounding that lesion, similar to mildly increased when compared to the most recent brain MRI. There is hydrocephalus of the right temporal horn, and that has increased when compared to the brain MRI from earlier this month. The basilar cisterns remain patent. Intracranial vasculature is unremarkable in appearance. No acute loss of the gray-white matter differentiation is identified to suggest acute or subacute infarct. ORBITS: The visualized portion of the orbits demonstrate no acute abnormality. SINUSES: The visualized paranasal sinuses and mastoid air cells demonstrate no acute abnormality. SOFT TISSUES/SKULL:  Right parietal craniectomy again seen. Calvarium is otherwise unremarkable. Redemonstration of a large heterogeneous mass centered on the splenium of the corpus callosum. The absolute size that appears roughly unchanged. The only notable change between the current examination on MRI from 06/01/2020 is increased hydrocephalus of right temporal horn, which suggests increasing obstruction of that ventricle. Xr Chest Portable    Result Date: 6/24/2020  EXAMINATION: ONE XRAY VIEW OF THE CHEST 6/24/2020 3:30 pm COMPARISON: None. HISTORY: ORDERING SYSTEM PROVIDED HISTORY: ams TECHNOLOGIST PROVIDED HISTORY: Reason for exam:->ams Reason for Exam: AMS, unable to do basic everyday needs, emesis started today. finished round 2 chemo/radiation 1 week ago Acuity: Acute Type of Exam: Initial FINDINGS: HEART/MEDIASTINUM: The cardiomediastinal silhouette is within normal limits. PLEURA/LUNGS: There are low lung volumes. There are no focal consolidations or pleural effusions. There is no appreciable pneumothorax. BONES/SOFT TISSUE: No acute abnormality. No radiographic evidence of acute pulmonary disease.      Mri Brain W Wo Contrast    Result Date: 6/1/2020  EXAM: MRI BRAIN W WO CONTRAST INDICATION: Astrocytoma follow-up COMPARISON: 2/26/2020 TECHNIQUE: Multiplanar, multisequence MR imaging of the head obtained without and with IV. IV contrast: IV ProHance. FINDINGS: There is no diffusion restriction. There is no acute intracranial hemorrhage. There is no extra-axial fluid collection. Ventricular system is stable. 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 It measures approximately 7.0 x 6.0 x 4.5 cm (transverse by AP by craniocaudal dimension) in comparison to 7.0 x 5.9 x 4.1 cm in the prior exam. There is moderate surrounding T2 FLAIR hyperintensity/vasogenic edema, essentially unchanged. There is local regional mass effect. There is effacement of the posterior horn/trigone of bilateral lateral ventricles, right greater than left. There is intraventricular extension of tumor into the right lateral ventricle. Stable mild ventriculomegaly. Previously seen punctate enhancing focus in left frontal lobe now measures 2.0 x 1.1 cm (image 66 of series 24) with mild surrounding T2 FLAIR hyperintensity. Previously seen punctate enhancing focus in left parietal lobe now measures 2.5 x 1.3 cm (image 69) with surrounding T2 FLAIR hyperintensity. Previously seen punctate enhancing focus in posterior left parietal lobe now measures 2.6 x 1.9 cm (image 80 of series 24). There is surrounding T2 FLAIR hyperintensity. 1. 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 2. Interval significant increase in size of heterogeneously enhancing lesions in the left frontal and parietal lobes consistent with tumor progression. EKG: from ER, interpreted by self, normal sinus rhythm at 84.  No acute st elevation seen          MEDICAL DECISION MAKING:    Principal Problem:    Brain mass - New Problem to me. Known hx of cancer, follows with [facility]  Plan: admit. Consult dr Vickey Butler. On decadron. Active Problems:    Hydrocephalus ([address] -New Problem to me. Likely due to obstruction from mass  Plan: admit, likely needs MRI. Altered mental state -New Problem to me. ?due to hydrocephalus. Plan: on steroids. monitor    Hypokalemia -New Problem to me. Plan: kcl replacement scale ordered    GERD (gastroesophageal reflux disease) -Established problem. Stable. Plan: on ppi. HTN (hypertension) -Established problem. Stable. 127/75  Plan: Pt home BP meds reviewed and will be continued. IV Hydralazine ordered for control of extremely high blood pressures   Will monitor labs to assess Creat/K for possible complications of medications. Diagnoses as listed above, designated as new or established and plan outlined for each. Data Reviewed:   (1) Lab tests were reviewed or ordered. (1) Radiology tests were reviewed or ordered. (1) Medical test (Echo, EKG, PFT/mario) were ordered. (1)History was obtained from someone other than patient  (1) Old records  were reviewed - see HPI/MDM for pertinent details if review done. (2) Case wasdiscussed with another health care provider: IVY Quan  (2) Imaging was viewed by myself. Ct head  (2) EKG  was viewed by myself. The patient isbeing placed in inpatient status with the expectation of requiring a hospital stay spanning at least two midnights for care and treatment of the problems noted in the problem list.  This determination is also based on thepatients comorbidities and past medical history, the severity and timing of the signs and symptoms upon presentation.         Electronically signed by: Daysi Card 6/24/2020

## 2020-06-24 NOTE — PROGRESS NOTES
Bedside transfer report received from [facility] in ED. Pt transported to  via wheelchair, VSS, pt denies pain. Pt oriented to room and call button.

## 2020-06-25 LAB
A/G RATIO: 1.7 (ref 1.1–2.2)
ALBUMIN SERPL-MCNC: 3.6 G/DL (ref 3.4–5)
ALP BLD-CCNC: 51 U/L (ref 40–129)
ALT SERPL-CCNC: 26 U/L (ref 10–40)
ANION GAP SERPL CALCULATED.3IONS-SCNC: 9 MMOL/L (ref 3–16)
AST SERPL-CCNC: 16 U/L (ref 15–37)
BASOPHILS ABSOLUTE: 0 K/UL (ref 0–0.2)
BASOPHILS RELATIVE PERCENT: 0.2 %
BILIRUB SERPL-MCNC: 0.4 MG/DL (ref 0–1)
BUN BLDV-MCNC: 14 MG/DL (ref 7–20)
CALCIUM SERPL-MCNC: 9 MG/DL (ref 8.3–10.6)
CHLORIDE BLD-SCNC: 108 MMOL/L (ref 99–110)
CO2: 25 MMOL/L (ref 21–32)
CREAT SERPL-MCNC: 0.6 MG/DL (ref 0.6–1.1)
EKG ATRIAL RATE: 84 BPM
EKG DIAGNOSIS: NORMAL
EKG P AXIS: 31 DEGREES
EKG P-R INTERVAL: 140 MS
EKG Q-T INTERVAL: 362 MS
EKG QRS DURATION: 80 MS
EKG QTC CALCULATION (BAZETT): 427 MS
EKG R AXIS: 7 DEGREES
EKG T AXIS: 39 DEGREES
EKG VENTRICULAR RATE: 84 BPM
EOSINOPHILS ABSOLUTE: 0 K/UL (ref 0–0.6)
EOSINOPHILS RELATIVE PERCENT: 0.1 %
ESTIMATED AVERAGE GLUCOSE: 79.6 MG/DL
GFR AFRICAN AMERICAN: >60
GFR NON-AFRICAN AMERICAN: >60
GLOBULIN: 2.1 G/DL
GLUCOSE BLD-MCNC: 105 MG/DL (ref 70–99)
GLUCOSE BLD-MCNC: 107 MG/DL (ref 70–99)
GLUCOSE BLD-MCNC: 149 MG/DL (ref 70–99)
GLUCOSE BLD-MCNC: 154 MG/DL (ref 70–99)
GLUCOSE BLD-MCNC: 90 MG/DL (ref 70–99)
HBA1C MFR BLD: 4.4 %
HCT VFR BLD CALC: 31.9 % (ref 36–48)
HEMOGLOBIN: 11.1 G/DL (ref 12–16)
LYMPHOCYTES ABSOLUTE: 0.5 K/UL (ref 1–5.1)
LYMPHOCYTES RELATIVE PERCENT: 10.8 %
MCH RBC QN AUTO: 37.9 PG (ref 26–34)
MCHC RBC AUTO-ENTMCNC: 34.7 G/DL (ref 31–36)
MCV RBC AUTO: 109.4 FL (ref 80–100)
MONOCYTES ABSOLUTE: 0.3 K/UL (ref 0–1.3)
MONOCYTES RELATIVE PERCENT: 5.8 %
NEUTROPHILS ABSOLUTE: 3.8 K/UL (ref 1.7–7.7)
NEUTROPHILS RELATIVE PERCENT: 83.1 %
PDW BLD-RTO: 20.2 % (ref 12.4–15.4)
PERFORMED ON: ABNORMAL
PERFORMED ON: NORMAL
PLATELET # BLD: 160 K/UL (ref 135–450)
PMV BLD AUTO: 6.8 FL (ref 5–10.5)
POTASSIUM REFLEX MAGNESIUM: 3.9 MMOL/L (ref 3.5–5.1)
RBC # BLD: 2.92 M/UL (ref 4–5.2)
SODIUM BLD-SCNC: 142 MMOL/L (ref 136–145)
TOTAL PROTEIN: 5.7 G/DL (ref 6.4–8.2)
WBC # BLD: 4.6 K/UL (ref 4–11)

## 2020-06-25 PROCEDURE — 2580000003 HC RX 258: Performed by: INTERNAL MEDICINE

## 2020-06-25 PROCEDURE — 36415 COLL VENOUS BLD VENIPUNCTURE: CPT

## 2020-06-25 PROCEDURE — 93010 ELECTROCARDIOGRAM REPORT: CPT | Performed by: INTERNAL MEDICINE

## 2020-06-25 PROCEDURE — 85025 COMPLETE CBC W/AUTO DIFF WBC: CPT

## 2020-06-25 PROCEDURE — 6360000002 HC RX W HCPCS: Performed by: INTERNAL MEDICINE

## 2020-06-25 PROCEDURE — 6370000000 HC RX 637 (ALT 250 FOR IP): Performed by: NURSE PRACTITIONER

## 2020-06-25 PROCEDURE — 80053 COMPREHEN METABOLIC PANEL: CPT

## 2020-06-25 PROCEDURE — 1200000000 HC SEMI PRIVATE

## 2020-06-25 PROCEDURE — 6370000000 HC RX 637 (ALT 250 FOR IP): Performed by: INTERNAL MEDICINE

## 2020-06-25 RX ORDER — LEVETIRACETAM 500 MG/1
1500 TABLET ORAL 2 TIMES DAILY
Status: DISCONTINUED | OUTPATIENT
Start: 2020-06-25 | End: 2020-06-25

## 2020-06-25 RX ORDER — LEVETIRACETAM 500 MG/1
1000 TABLET ORAL 2 TIMES DAILY
Status: DISCONTINUED | OUTPATIENT
Start: 2020-06-25 | End: 2020-06-27 | Stop reason: HOSPADM

## 2020-06-25 RX ADMIN — POTASSIUM CHLORIDE 10 MEQ: 7.46 INJECTION, SOLUTION INTRAVENOUS at 00:48

## 2020-06-25 RX ADMIN — ENOXAPARIN SODIUM 40 MG: 40 INJECTION SUBCUTANEOUS at 20:56

## 2020-06-25 RX ADMIN — INSULIN LISPRO 1 UNITS: 100 INJECTION, SOLUTION INTRAVENOUS; SUBCUTANEOUS at 20:59

## 2020-06-25 RX ADMIN — Medication 10 ML: at 08:06

## 2020-06-25 RX ADMIN — Medication 10 ML: at 21:03

## 2020-06-25 RX ADMIN — DEXAMETHASONE 4 MG: 4 TABLET ORAL at 08:07

## 2020-06-25 RX ADMIN — DEXAMETHASONE 4 MG: 4 TABLET ORAL at 20:56

## 2020-06-25 RX ADMIN — POTASSIUM CHLORIDE 10 MEQ: 7.46 INJECTION, SOLUTION INTRAVENOUS at 01:57

## 2020-06-25 RX ADMIN — LEVETIRACETAM 1000 MG: 500 TABLET ORAL at 13:53

## 2020-06-25 RX ADMIN — LEVETIRACETAM 1000 MG: 500 TABLET ORAL at 20:56

## 2020-06-25 ASSESSMENT — ENCOUNTER SYMPTOMS
SORE THROAT: 0
RHINORRHEA: 0
VOMITING: 1
SHORTNESS OF BREATH: 0
NAUSEA: 1
COUGH: 0
EYE PAIN: 0
EYE REDNESS: 0

## 2020-06-25 ASSESSMENT — PAIN SCALES - GENERAL
PAINLEVEL_OUTOF10: 0

## 2020-06-25 NOTE — PROGRESS NOTES
Head to toe assessment complete. Vital signs obtained. Pt resting in bed at this time. AM medication administered. Pt tolerated well. Pt denies pain. MRI questionnaire completed and faxed. Pt denies further needs at this time. Call light in hand. Pt verbalizes correct use. Seizure precautions initiated. Bed alarm set. Will continue to monitor.  Electronically signed by Mely Caruso RN on 6/25/2020 at 8:16 AM

## 2020-06-25 NOTE — CARE COORDINATION
CM reviewed chart and perfect served physician about palliative care consult, order placed. Demographic sheet and MAR faxed to [facility] at [phone].     Hugo Dover RN, BSN  [phone]

## 2020-06-25 NOTE — PROGRESS NOTES
Message left for MRI to see if she can have it today.  Electronically signed by Loy Jean-Baptiste RN on 6/25/2020 at 5:23 PM

## 2020-06-25 NOTE — CONSULTS
[facility]   Consult Note      6/25/2020 9:29 AM        Name: Sisi Mcbride . 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 Completed concurrent with radiation 6000 cGy from 3/23/200 to 5/1/2020. Patient also had chemotherapy with Temodar 75 mg/m² daily. MRI brain done on 6/1/2020 showed significant increase in heterogeneously enhancing lesions in the left frontal and parietal lobes consistent with tumor progression. MRI brain was discussed in the tumor board on 6/9/2020. This was thought to be pseudo-progression. She has been on maintenance Temodar 150 mg/m²/day on days 1-5 every 28 days for 6-12 cycles. She presented to the ED yesterday with altered mental status, increasing confusion, nausea, vomiting, and headache. CT head stable mass on the splenium of the corpus callosum, and increased hydrocephalus of right temporal horn. MRI of brain has been ordered for today    Currently patient denies n/v, she has a mild headache. She denies vision changes. She appears slightly confused, speech is slowed.         Reviewed interval ancillary notes    Current Medications  0.9 % sodium chloride infusion, Continuous  sodium chloride flush 0.9 % injection 10 mL, 2 times per day  sodium chloride flush 0.9 % injection 10 mL, PRN  potassium chloride (KLOR-CON M) extended release tablet 40 mEq, PRN    Or  potassium bicarb-citric acid (EFFER-K) effervescent tablet 40 mEq, PRN    Or  potassium chloride 10 mEq/100 mL IVPB (Peripheral Line), PRN  acetaminophen (TYLENOL) tablet 650 mg, Q6H PRN    Or  acetaminophen (TYLENOL) suppository 650 mg, Q6H PRN  magnesium hydroxide (MILK OF MAGNESIA) 400 MG/5ML suspension 30 mL, Daily PRN  promethazine (PHENERGAN) tablet 12.5 mg, Q6H PRN    Or  ondansetron (ZOFRAN) injection 4 mg, Q6H PRN  famotidine (PEPCID) tablet 20 mg, BID PRN  enoxaparin (LOVENOX) injection 40 mg, Nightly  dexamethasone (DECADRON) tablet 4 mg, 2 times per day  0.9 % sodium chloride bolus, PRN  hydrALAZINE (APRESOLINE) injection 10 mg, Q6H PRN  insulin lispro (1 Unit Dial) 0-12 Units, TID WC  insulin lispro (1 Unit Dial) 0-6 Units, Nightly  glucose (GLUTOSE) 40 % oral gel 15 g, PRN  dextrose 50 % IV solution, PRN  glucagon (rDNA) injection 1 mg, PRN  dextrose 5 % solution, PRN        Objective:  /86   Pulse 64   Temp 97.7 °F (36.5 °C) (Axillary)   Resp 16   Ht 5' 2\" (1.575 m)   Wt 228 lb 3.2 oz (103.5 kg)   SpO2 100%   BMI 41.74 kg/m²     Intake/Output Summary (Last 24 hours) at 6/25/2020 0929  Last data filed at 6/25/2020 0816  Gross per 24 hour   Intake 1807 ml   Output --   Net 1807 ml      Wt Readings from Last 3 Encounters:   06/24/20 228 lb 3.2 oz (103.5 kg)   02/26/20 234 lb (106.1 kg)       General appearance:  Appears comfortable  Eyes: Sclera clear. Pupils equal.  ENT: Moist oral mucosa. Trachea midline, no adenopathy. Cardiovascular: Regular rhythm, normal S1, S2. No murmur. No edema in lower extremities  Respiratory: Not using accessory muscles. Good inspiratory effort. Clear to auscultation bilaterally, no wheeze or crackles. GI: Abdomen soft, no tenderness, not distended  Musculoskeletal: No cyanosis in digits, neck supple  Neurology: CN 2-12 grossly intact.  No speech or motor deficits  Psych: normal affect Alert and oriented to person  Skin: Warm, dry, normal turgor    Labs and Tests:  CBC:   Recent Labs     06/24/20  1503 06/25/20  0415   WBC 5.3 4.6   HGB 12.7 11.1*    160     BMP:    Recent Labs     06/24/20  1503 06/25/20  0415    142   K 2.9* 3.9    108   CO2 25 25   BUN 17 14   CREATININE 0.6 0.6   GLUCOSE 116* 149*     Hepatic:   Recent Labs     06/24/20  1503 06/25/20  0415   AST 24 16   ALT 33 26   BILITOT 0.6 0.4   ALKPHOS 55 51       ASSESSMENT AND PLAN    Principal Problem:    Brain mass  Active Problems:    Hydrocephalus (HCC)    Altered mental state    Hypokalemia    GERD (gastroesophageal reflux disease)    HTN (hypertension)  Resolved Problems:    * No resolved hospital problems. *    Anaplastic astrocytoma  - Completed concurrent with radiation 6000 cGy from 3/23/200 to 5/1/2020. Patient also had chemotherapy with Temodar 75 mg/m² daily. -MRI brain done on 6/1/2020 showed significant increase in heterogeneously enhancing lesions in the left frontal and parietal lobes consistent with tumor progression.  - MRI brain was discussed in the tumor board on 6/9/2020. This was thought to be pseudo-progression.  - currently on Temodar 150 mg/m²/day on days 1-5 every 28 days for 6-12 cycles. - Check MRI brain today    History of seizures  - d/t brain tumor  - continue decadron 4 mg q 12hrs  - RN reports that the  stated that the patient had been recently taken off [address] by her neurologist because \"it wasn't working\", and they were planning on starting on a different medication. - upon further clarification with family by the RN, patient remains on [address] with plans to add Vimpat when approved. Pt sees Dr. Dwaine Meza, neurology. - keppra ordered, 1500 mg BID    Oliver Aase, CNP  [facility]    Patient was seen and examined. Agree with above. I talked to Dr. Cony Bartlett from neurology. He had recommended to decrease Keppra 1000 mg p.o. twice daily as patient was having some side effects. He is getting prior authorization for Vimpat to be administered as outpatient. Check MRI brain for evaluation of hydrocephalus.     Clyde Bazzi MD

## 2020-06-25 NOTE — PROGRESS NOTES
.4 Eyes Skin Assessment     The patient is being assess for  Admission    I agree that 2 RN's have performed a thorough Head to Toe Skin Assessment on the patient. ALL assessment sites listed below have been assessed. Areas assessed by both nurses: Head to toe  [x]   Head, Face, and Ears   [x]   Shoulders, Back, and Chest  [x]   Arms, Elbows, and Hands   [x]   Coccyx, Sacrum, and IschIum  [x]   Legs, Feet, and Heels        Does the Patient have Skin Breakdown?   No         Yuri Prevention initiated:  No   Wound Care Orders initiated:  No      [facility] nurse consulted for Pressure Injury (Stage 3,4, Unstageable, DTI, NWPT, and Complex wounds), New and Established Ostomies:  No      Nurse 1 eSignature: Electronically signed by Yash Gallrado RN on 6/25/20 at 1:18 AM EDT    **SHARE this note so that the co-signing nurse is able to place an eSignature**    Nurse 2 eSignature: Electronically signed by Ann Marie RN on 6/25/20 at 4:14 AM EDT

## 2020-06-25 NOTE — PROGRESS NOTES
Progress Note - Dr. Elva Engle - Internal Medicine  PCP: MD [id number] / [place] Torres [id number] [address] Day: 1  Code Status: Full Code  Current Diet: DIET GENERAL;        CC: follow up on medical issues    Subjective:   Nicko Felder is a 49 y.o. female. She denies problems    Doing about the same  Still confused  Awaiting MRI    She denies chest pain, denies shortness of breath, denies nausea,  denies emesis. 10 system Review of Systems is reviewed with patient, and pertinent positives are listed here: None . Otherwise, Review of systems is negative. I have reviewed the patient's medical and social history in detail and updated the computerized patient record. To recap: She  has a past medical history of Brain tumor [facility]), History of chemotherapy, History of radiation therapy, and Hypertension. . She  has a past surgical history that includes lipoma resection; Pilonidal cyst excision; and craniotomy (Right, 2/26/2020). . She  reports that she has never smoked. She has never used smokeless tobacco. She reports previous alcohol use. She reports that she does not use drugs. .        Active Hospital Problems    Diagnosis Date Noted    Hydrocephalus ([facility] [address] [G91.9] 06/24/2020    Altered mental state [R41.82] 06/24/2020    Hypokalemia [E87.6] 06/24/2020    GERD (gastroesophageal reflux disease) [K21.9] 06/24/2020    HTN (hypertension) [I10] 06/24/2020    Brain mass [G93.89] 02/26/2020       Current Facility-Administered Medications: 0.9 % sodium chloride infusion, , Intravenous, Continuous  sodium chloride flush 0.9 % injection 10 mL, 10 mL, Intravenous, 2 times per day  sodium chloride flush 0.9 % injection 10 mL, 10 mL, Intravenous, PRN  potassium chloride (KLOR-CON M) extended release tablet 40 mEq, 40 mEq, Oral, PRN **OR** potassium bicarb-citric acid (EFFER-K) effervescent tablet 40 mEq, 40 mEq, Oral, PRN **OR** potassium chloride 10 mEq/100 mL IVPB (Peripheral Line), 10 mEq, Intravenous, PRN  acetaminophen (TYLENOL) tablet 650 mg, 650 mg, Oral, Q6H PRN **OR** acetaminophen (TYLENOL) suppository 650 mg, 650 mg, Rectal, Q6H PRN  magnesium hydroxide (MILK OF MAGNESIA) 400 MG/5ML suspension 30 mL, 30 mL, Oral, Daily PRN  promethazine (PHENERGAN) tablet 12.5 mg, 12.5 mg, Oral, Q6H PRN **OR** ondansetron (ZOFRAN) injection 4 mg, 4 mg, Intravenous, Q6H PRN  famotidine (PEPCID) tablet 20 mg, 20 mg, Oral, BID PRN  enoxaparin (LOVENOX) injection 40 mg, 40 mg, Subcutaneous, Nightly  dexamethasone (DECADRON) tablet 4 mg, 4 mg, Oral, 2 times per day  0.9 % sodium chloride bolus, 500 mL, Intravenous, PRN  hydrALAZINE (APRESOLINE) injection 10 mg, 10 mg, Intravenous, Q6H PRN  insulin lispro (1 Unit Dial) 0-12 Units, 0-12 Units, Subcutaneous, TID WC  insulin lispro (1 Unit Dial) 0-6 Units, 0-6 Units, Subcutaneous, Nightly  glucose (GLUTOSE) 40 % oral gel 15 g, 15 g, Oral, PRN  dextrose 50 % IV solution, 12.5 g, Intravenous, PRN  glucagon (rDNA) injection 1 mg, 1 mg, Intramuscular, PRN  dextrose 5 % solution, 100 mL/hr, Intravenous, PRN         Objective:  /86   Pulse 64   Temp 97.7 °F (36.5 °C) (Axillary)   Resp 16   Ht 5' 2\" (1.575 m)   Wt 228 lb 3.2 oz (103.5 kg)   SpO2 100%   BMI 41.74 kg/m²      Patient Vitals for the past 24 hrs:   BP Temp Temp src Pulse Resp SpO2 Height Weight   06/25/20 0805 123/86 97.7 °F (36.5 °C) Axillary 64 16 100 % -- --   06/25/20 0435 133/79 98 °F (36.7 °C) Oral 56 18 99 % -- --   06/24/20 2341 113/66 98.3 °F (36.8 °C) Oral 70 16 97 % -- --   06/24/20 2133 -- -- -- -- 16 96 % -- --   06/24/20 2045 121/75 98.4 °F (36.9 °C) Oral 83 16 96 % -- --   06/24/20 1844 127/75 98.4 °F (36.9 °C) Oral 70 16 98 % 5' 2\" (1.575 m) 228 lb 3.2 oz (103.5 kg)   06/24/20 1730 (!) 164/75 -- -- 67 14 96 % -- --   06/24/20 1700 (!) 156/80 -- -- 67 13 95 % -- --   06/24/20 1649 (!) 146/66 -- -- 68 15 -- -- --   06/24/20 1500 (!) 166/99 -- -- 82 15 98 % -- --   06/24/20 1304 116/72 97.6 °F (36.4 °C) Temporal 90 16 96 % 5' 2\" (1.575 m) 234 lb (106.1 kg)     Patient Vitals for the past 96 hrs (Last 3 readings):   Weight   06/24/20 1844 228 lb 3.2 oz (103.5 kg)   06/24/20 1304 234 lb (106.1 kg)           Intake/Output Summary (Last 24 hours) at 6/25/2020 1139  Last data filed at 6/25/2020 0816  Gross per 24 hour   Intake 1807 ml   Output --   Net 1807 ml         Physical Exam:   S1, S2 normal, no murmur, rub or gallop, regular rate and rhythm  clear to auscultation bilaterally  abdomen is soft without significant tenderness, masses, organomegaly or guarding  extremities normal, atraumatic, no cyanosis or edema    Labs:  Lab Results   Component Value Date    WBC 4.6 06/25/2020    HGB 11.1 (L) 06/25/2020    HCT 31.9 (L) 06/25/2020     06/25/2020    ALT 26 06/25/2020    AST 16 06/25/2020     06/25/2020    K 3.9 06/25/2020     06/25/2020    CREATININE 0.6 06/25/2020    BUN 14 06/25/2020    CO2 25 06/25/2020    INR 1.18 (H) 02/27/2020    LABA1C 4.4 06/24/2020    LABMICR YES 06/24/2020     Lab Results   Component Value Date    TROPONINI <0.01 06/24/2020       Recent Imaging Results are Reviewed:  Ct Head Wo Contrast    Result Date: 6/24/2020  EXAMINATION: CT OF THE HEAD WITHOUT CONTRAST  6/24/2020 12:19 pm TECHNIQUE: CT of the head was performed without the administration of intravenous contrast. Dose modulation, iterative reconstruction, and/or weight based adjustment of the mA/kV was utilized to reduce the radiation dose to as low as reasonably achievable. COMPARISON: 02/26/2020 Brain MRI, 06/01/2020 HISTORY: ORDERING SYSTEM PROVIDED HISTORY: ams TECHNOLOGIST PROVIDED HISTORY: Reason for exam:->ams Has a \"code stroke\" or \"stroke alert\" been called? ->No Is the patient pregnant?->No Reason for Exam: ams Acuity: Unknown Type of Exam: Unknown FINDINGS: BRAIN/VENTRICLES: There is redemonstration of a heterogeneous mass which is again noted be centered within splenium of the corpus callosum, with extension to both cerebral hemispheres. The size that appears basically unchanged when compared to the MRI performed 06/01/2020, measuring roughly 6.7 cm maximally. Multiple internal calcifications are again identified. There is degenerative edema is identified surrounding that lesion, similar to mildly increased when compared to the most recent brain MRI. There is hydrocephalus of the right temporal horn, and that has increased when compared to the brain MRI from earlier this month. The basilar cisterns remain patent. Intracranial vasculature is unremarkable in appearance. No acute loss of the gray-white matter differentiation is identified to suggest acute or subacute infarct. ORBITS: The visualized portion of the orbits demonstrate no acute abnormality. SINUSES: The visualized paranasal sinuses and mastoid air cells demonstrate no acute abnormality. SOFT TISSUES/SKULL:  Right parietal craniectomy again seen. Calvarium is otherwise unremarkable. Redemonstration of a large heterogeneous mass centered on the splenium of the corpus callosum. The absolute size that appears roughly unchanged. The only notable change between the current examination on MRI from 06/01/2020 is increased hydrocephalus of right temporal horn, which suggests increasing obstruction of that ventricle. Xr Chest Portable    Result Date: 6/24/2020  EXAMINATION: ONE XRAY VIEW OF THE CHEST 6/24/2020 3:30 pm COMPARISON: None. HISTORY: ORDERING SYSTEM PROVIDED HISTORY: ams TECHNOLOGIST PROVIDED HISTORY: Reason for exam:->ams Reason for Exam: AMS, unable to do basic everyday needs, emesis started today. finished round 2 chemo/radiation 1 week ago Acuity: Acute Type of Exam: Initial FINDINGS: HEART/MEDIASTINUM: The cardiomediastinal silhouette is within normal limits. PLEURA/LUNGS: There are low lung volumes. There are no focal consolidations or pleural effusions. There is no appreciable pneumothorax. BONES/SOFT TISSUE: No acute abnormality. No radiographic evidence of acute pulmonary disease. Mri Brain W Wo Contrast    Result Date: 6/1/2020  EXAM: MRI BRAIN W WO CONTRAST INDICATION: Astrocytoma follow-up COMPARISON: 2/26/2020 TECHNIQUE: Multiplanar, multisequence MR imaging of the head obtained without and with IV. IV contrast: IV ProHance. FINDINGS: There is no diffusion restriction. There is no acute intracranial hemorrhage. There is no extra-axial fluid collection. Ventricular system is stable. 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 It measures approximately 7.0 x 6.0 x 4.5 cm (transverse by AP by craniocaudal dimension) in comparison to 7.0 x 5.9 x 4.1 cm in the prior exam. There is moderate surrounding T2 FLAIR hyperintensity/vasogenic edema, essentially unchanged. There is local regional mass effect. There is effacement of the posterior horn/trigone of bilateral lateral ventricles, right greater than left. There is intraventricular extension of tumor into the right lateral ventricle. Stable mild ventriculomegaly. Previously seen punctate enhancing focus in left frontal lobe now measures 2.0 x 1.1 cm (image 66 of series 24) with mild surrounding T2 FLAIR hyperintensity. Previously seen punctate enhancing focus in left parietal lobe now measures 2.5 x 1.3 cm (image 69) with surrounding T2 FLAIR hyperintensity. Previously seen punctate enhancing focus in posterior left parietal lobe now measures 2.6 x 1.9 cm (image 80 of series 24). There is surrounding T2 FLAIR hyperintensity. 1. 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  2. Interval significant increase in size of heterogeneously enhancing lesions

## 2020-06-25 NOTE — CARE COORDINATION
Discharge Planning Assessment  Discharge Planning Assessment  RN/SW discharge planner met with patient/ (and family member) to discuss reason for admission, current living situation, and potential needs at the time of discharge  in room and assisted with questions pt not able to answer at this time     Demographics/Insurance verified Yes- pt is currently living with her parents at : [address] [facility] [address]  does not want facesheet changed d/t that is where bills go. Current type of dwelling:Ranch home with a basement and a ramp to enter     Patient from ECF/SW confirmed with:n/a    Living arrangements: pt is living with her parents at this time,  is not living there currently     Level of function/support: independent until approx 2 weeks ago - pt has someone at home with her at all times     PCP: Dillon Aguilar    Last Visit to PCP: February     DME:4 wheel walker, w/c, shower chair, grab bars for shower and toilet. Active with any community resources/agencies/skilled home care:Was active with [facility] until April, verified with Beatrice Valera from [facility]. Medication compliance issues:  states they use a chart, fill a weekly medication dispenser and pt's mother makes sure she takes her medications     Financial issues that could impact healthcare: none         Tentative discharge plan:home with home care vs palliative care     Discussed and provided facilities of choice if transition to a skilled nursing facility is required at the time of discharge - awaiting palliative care consult and therapy evaluations     Discussed with patient and/or family that on the day of discharge home tentative time of discharge will be between 10 AM and noon.     Transportation at the time of discharge: family Osvaldo Willard RN, BSN  [phone]

## 2020-06-25 NOTE — PLAN OF CARE
Problem: Skin Integrity:  Goal: Will show no infection signs and symptoms  Description: Will show no infection signs and symptoms  6/25/2020 0817 by Pily Huber RN  Outcome: Ongoing  6/25/2020 0348 by Francesco Dougherty RN  Outcome: Ongoing  Goal: Absence of new skin breakdown  Description: Absence of new skin breakdown  6/25/2020 0817 by Pily Huber RN  Outcome: Ongoing  6/25/2020 0348 by Francesco Dougherty RN  Outcome: Ongoing     Problem: Falls - Risk of:  Goal: Will remain free from falls  Description: Will remain free from falls  6/25/2020 0817 by Pily Huber RN  Outcome: Ongoing  6/25/2020 0348 by Francesco Dougherty RN  Outcome: Ongoing  Goal: Absence of physical injury  Description: Absence of physical injury  6/25/2020 0817 by Pily Huber RN  Outcome: Ongoing  6/25/2020 0348 by Francesco Dougherty RN  Outcome: Ongoing

## 2020-06-25 NOTE — PLAN OF CARE
Problem: Skin Integrity:  Goal: Will show no infection signs and symptoms  Description: Will show no infection signs and symptoms  Outcome: Ongoing  Goal: Absence of new skin breakdown  Description: Absence of new skin breakdown  Outcome: Ongoing     Problem: Falls - Risk of:  Goal: Will remain free from falls  Description: Will remain free from falls  Outcome: Ongoing  Goal: Absence of physical injury  Description: Absence of physical injury  Outcome: Ongoing

## 2020-06-26 ENCOUNTER — APPOINTMENT (OUTPATIENT)
Dept: MRI IMAGING | Age: 55
DRG: 054 | End: 2020-06-26
Payer: COMMERCIAL

## 2020-06-26 PROBLEM — C71.9 ASTROCYTOMA BRAIN TUMOR (HCC): Status: ACTIVE | Noted: 2020-06-26

## 2020-06-26 LAB
AMMONIA: 30 UMOL/L (ref 11–51)
ANION GAP SERPL CALCULATED.3IONS-SCNC: 11 MMOL/L (ref 3–16)
BASOPHILS ABSOLUTE: 0 K/UL (ref 0–0.2)
BASOPHILS RELATIVE PERCENT: 0.5 %
BILIRUBIN URINE: NEGATIVE
BLOOD, URINE: NEGATIVE
BUN BLDV-MCNC: 14 MG/DL (ref 7–20)
CALCIUM SERPL-MCNC: 9.2 MG/DL (ref 8.3–10.6)
CHLORIDE BLD-SCNC: 108 MMOL/L (ref 99–110)
CLARITY: CLEAR
CO2: 24 MMOL/L (ref 21–32)
COLOR: YELLOW
CREAT SERPL-MCNC: 0.6 MG/DL (ref 0.6–1.1)
EOSINOPHILS ABSOLUTE: 0 K/UL (ref 0–0.6)
EOSINOPHILS RELATIVE PERCENT: 1 %
GFR AFRICAN AMERICAN: >60
GFR NON-AFRICAN AMERICAN: >60
GLUCOSE BLD-MCNC: 100 MG/DL (ref 70–99)
GLUCOSE BLD-MCNC: 107 MG/DL (ref 70–99)
GLUCOSE BLD-MCNC: 120 MG/DL (ref 70–99)
GLUCOSE BLD-MCNC: 122 MG/DL (ref 70–99)
GLUCOSE BLD-MCNC: 99 MG/DL (ref 70–99)
GLUCOSE URINE: NEGATIVE MG/DL
HCT VFR BLD CALC: 35.6 % (ref 36–48)
HEMOGLOBIN: 12.2 G/DL (ref 12–16)
KETONES, URINE: NEGATIVE MG/DL
LEUKOCYTE ESTERASE, URINE: ABNORMAL
LYMPHOCYTES ABSOLUTE: 1 K/UL (ref 1–5.1)
LYMPHOCYTES RELATIVE PERCENT: 22.1 %
MCH RBC QN AUTO: 39.1 PG (ref 26–34)
MCHC RBC AUTO-ENTMCNC: 34.3 G/DL (ref 31–36)
MCV RBC AUTO: 114 FL (ref 80–100)
MICROSCOPIC EXAMINATION: YES
MONOCYTES ABSOLUTE: 0.6 K/UL (ref 0–1.3)
MONOCYTES RELATIVE PERCENT: 13.4 %
NEUTROPHILS ABSOLUTE: 2.9 K/UL (ref 1.7–7.7)
NEUTROPHILS RELATIVE PERCENT: 63 %
NITRITE, URINE: NEGATIVE
PDW BLD-RTO: 20.1 % (ref 12.4–15.4)
PERFORMED ON: ABNORMAL
PH UA: 7 (ref 5–8)
PLATELET # BLD: 171 K/UL (ref 135–450)
PMV BLD AUTO: 6.6 FL (ref 5–10.5)
POTASSIUM SERPL-SCNC: 3.2 MMOL/L (ref 3.5–5.1)
PROTEIN UA: NEGATIVE MG/DL
RBC # BLD: 3.12 M/UL (ref 4–5.2)
SODIUM BLD-SCNC: 143 MMOL/L (ref 136–145)
SPECIFIC GRAVITY UA: 1.01 (ref 1–1.03)
URINE TYPE: ABNORMAL
UROBILINOGEN, URINE: 1 E.U./DL
WBC # BLD: 4.5 K/UL (ref 4–11)

## 2020-06-26 PROCEDURE — 97166 OT EVAL MOD COMPLEX 45 MIN: CPT

## 2020-06-26 PROCEDURE — 81001 URINALYSIS AUTO W/SCOPE: CPT

## 2020-06-26 PROCEDURE — 97116 GAIT TRAINING THERAPY: CPT

## 2020-06-26 PROCEDURE — 97530 THERAPEUTIC ACTIVITIES: CPT

## 2020-06-26 PROCEDURE — 6370000000 HC RX 637 (ALT 250 FOR IP): Performed by: INTERNAL MEDICINE

## 2020-06-26 PROCEDURE — 1200000000 HC SEMI PRIVATE

## 2020-06-26 PROCEDURE — 97161 PT EVAL LOW COMPLEX 20 MIN: CPT

## 2020-06-26 PROCEDURE — 6360000004 HC RX CONTRAST MEDICATION: Performed by: INTERNAL MEDICINE

## 2020-06-26 PROCEDURE — 6360000002 HC RX W HCPCS: Performed by: INTERNAL MEDICINE

## 2020-06-26 PROCEDURE — 80048 BASIC METABOLIC PNL TOTAL CA: CPT

## 2020-06-26 PROCEDURE — 36415 COLL VENOUS BLD VENIPUNCTURE: CPT

## 2020-06-26 PROCEDURE — 85025 COMPLETE CBC W/AUTO DIFF WBC: CPT

## 2020-06-26 PROCEDURE — 97535 SELF CARE MNGMENT TRAINING: CPT

## 2020-06-26 PROCEDURE — 70553 MRI BRAIN STEM W/O & W/DYE: CPT

## 2020-06-26 PROCEDURE — 82140 ASSAY OF AMMONIA: CPT

## 2020-06-26 PROCEDURE — 2580000003 HC RX 258: Performed by: INTERNAL MEDICINE

## 2020-06-26 PROCEDURE — A9577 INJ MULTIHANCE: HCPCS | Performed by: INTERNAL MEDICINE

## 2020-06-26 RX ORDER — SODIUM CHLORIDE 0.9 % (FLUSH) 0.9 %
10 SYRINGE (ML) INJECTION ONCE
Status: COMPLETED | OUTPATIENT
Start: 2020-06-26 | End: 2020-06-26

## 2020-06-26 RX ADMIN — LEVETIRACETAM 1000 MG: 500 TABLET ORAL at 20:54

## 2020-06-26 RX ADMIN — POTASSIUM CHLORIDE 40 MEQ: 1500 TABLET, EXTENDED RELEASE ORAL at 21:49

## 2020-06-26 RX ADMIN — Medication 10 ML: at 11:19

## 2020-06-26 RX ADMIN — DEXAMETHASONE 4 MG: 4 TABLET ORAL at 08:24

## 2020-06-26 RX ADMIN — GADOBENATE DIMEGLUMINE 20 ML: 529 INJECTION, SOLUTION INTRAVENOUS at 11:17

## 2020-06-26 RX ADMIN — ENOXAPARIN SODIUM 40 MG: 40 INJECTION SUBCUTANEOUS at 20:54

## 2020-06-26 RX ADMIN — DEXAMETHASONE 4 MG: 4 TABLET ORAL at 20:54

## 2020-06-26 RX ADMIN — LEVETIRACETAM 1000 MG: 500 TABLET ORAL at 08:24

## 2020-06-26 RX ADMIN — SODIUM CHLORIDE: 9 INJECTION, SOLUTION INTRAVENOUS at 04:44

## 2020-06-26 ASSESSMENT — PAIN SCALES - GENERAL
PAINLEVEL_OUTOF10: 0

## 2020-06-26 NOTE — PLAN OF CARE
Problem: Skin Integrity:  Goal: Will show no infection signs and symptoms  Description: Will show no infection signs and symptoms  6/26/2020 0040 by Yris Estevez RN  Outcome: Ongoing     Problem: Skin Integrity:  Goal: Absence of new skin breakdown  Description: Absence of new skin breakdown  6/26/2020 0040 by Yris Estevez RN  Outcome: Ongoing     Problem: Falls - Risk of:  Goal: Will remain free from falls  Description: Will remain free from falls  6/26/2020 0841 by Jayne Schirmer, RN  Outcome: Ongoing     Problem: Falls - Risk of:  Goal: Absence of physical injury  Description: Absence of physical injury  6/26/2020 0841 by Jayne Schirmer, RN  Outcome: Ongoing

## 2020-06-26 NOTE — PROGRESS NOTES
Physical Therapy    Facility/Department: [address] ORTHO/NEURO NURSING  Initial Assessment    NAME: Jer Savage  : 1965  MRN: [id number]    Date of Service: 2020    Discharge Recommendations:    Jer Savage scored a 18/24 on the AM-PAC short mobility form. Current research shows that an AM-PAC score of 18 or greater is typically associated with a discharge to the patient's home setting. Based on the patient's AM-PAC score and their current functional mobility deficits, it is recommended that the patient have 2-3 sessions per week of Physical Therapy at d/c to increase the patient's independence. At this time, this patient demonstrates the endurance and safety to discharge home with [facility]  PT services  and a follow up treatment frequency of 2-3x/wk. Please see assessment section for further patient specific details. If patient discharges prior to next session this note will serve as a discharge summary. Please see below for the latest assessment towards goals. HOME HEALTH CARE: LEVEL 3 SAFETY     - Initial home health evaluation to occur within 24-48 hours, in patient home   - Therapy evaluations in home within 24-48 hours of discharge; including DME and home safety   - Frontload therapy 5 days, then 3x a week   - Therapy to evaluate if patient has [address] needs for personal care   -  evaluation within 24-48 hours, includes evaluation of resources and insurance to determine [place], [place], LTC, and Medicaid options     PT Equipment Recommendations  Equipment Needed: No  Other: pt has RW at home    Assessment   Body structures, Functions, Activity limitations: Decreased functional mobility ; Decreased cognition;Decreased posture;Decreased endurance  Assessment: pt presents with decreased safety awareness, poor spatial awareness and sequencing with mobility.   pt will benefit from continued skilled therapy services to reduce fall risk  Treatment Diagnosis: impaired functional mobility  Prognosis: Good  Decision Making: Low Complexity  PT Education: PT Role;Plan of Care  Patient Education: pt verbalized understanding of discharge recommendations  REQUIRES PT FOLLOW UP: Yes  Activity Tolerance  Activity Tolerance: Patient Tolerated treatment well       Patient Diagnosis(es): The primary encounter diagnosis was Altered mental status, unspecified altered mental status type. Diagnoses of Hypokalemia, Hydrocephalus, unspecified type ([address] and Brain mass were also pertinent to this visit. has a past medical history of Brain tumor [facility]), History of chemotherapy, History of radiation therapy, and Hypertension. has a past surgical history that includes lipoma resection; Pilonidal cyst excision; and craniotomy (Right, 2/26/2020). Restrictions  Restrictions/Precautions  Restrictions/Precautions: Fall Risk(high fall risk)  Position Activity Restriction  Other position/activity restrictions: Kelly Echevarria is a 49 y.o. female with history of brain tumor who presents to the emergency department with  at bedside who states that patient has been altered since yesterday. She has been increasingly confused today. When she arrived to the emergency department. She reportedly had a headache, nausea and vomiting.  gave the patient a dose of Vicodin but he is not sure if she kept this down or vomited it back up. The patient is not complaining of any pain or persistent nausea at this time. She takes dexamethasone and Keppra for the brain tumor. She has had seizure-like activity secondary to the brain tumor. She has finished radiation and is still doing chemotherapy. Her second round of chemotherapy was completed 1 week ago. Appropriate PPE donned prior to entering patients room this date:  gown, gloves, N-95 and face shield.       Vision/Hearing  Vision: Impaired  Vision Exceptions: Wears glasses at all times(contacts)  Hearing: Within functional limits Subjective  General  Chart Reviewed: Yes  Patient assessed for rehabilitation services?: Yes  Additional Pertinent Hx: HTN, brain tumor, craniotomy  Family / Caregiver Present: No  Diagnosis: Altered mental status  Follows Commands: Within Functional Limits  Subjective  Subjective: pt sitting on EOB at start of session, agreeable to therapy. Pain Screening  Patient Currently in Pain: Denies  Vital Signs  Patient Currently in Pain: Denies       Orientation  Orientation  Overall Orientation Status: Impaired  Orientation Level: Oriented to person;Oriented to place(difficulty answering all questions)  Social/Functional History  Social/Functional History  Lives With: Family, Spouse(; parents are \"in and out\"; plans to d/c to apt with , not to house with parents)  Type of Home: Apartment  Home Access: Elevator, Stairs to enter with rails  Entrance Stairs - Number of Steps: 1 flight of stairs to second floor apt  Entrance Stairs - Rails: Both  Bathroom Shower/Tub: Tub/Shower unit  Bathroom Toilet: Standard  Bathroom Equipment: Built-in shower seat  Home Equipment: Rolling walker, 4 wheeled walker  Receives Help From: Family  ADL Assistance: [address] [place] [address] Avenue: Independent  Homemaking Responsibilities: Yes  Ambulation Assistance: Independent  Transfer Assistance: Independent  Active : Yes  Additional Comments: Pt reports she has not had any recent falls.     Objective    AROM RLE (degrees)  RLE AROM: WFL  AROM LLE (degrees)  LLE AROM : WFL  Strength RLE  Strength RLE: WFL  Strength LLE  Strength LLE: WFL           Transfers  Sit to Stand: Contact guard assistance  Stand to sit: Contact guard assistance  Comment: from bed and toilet  Ambulation  Ambulation?: Yes  Ambulation 1  Surface: level tile  Device: Rolling Walker  Assistance: Minimal assistance  Quality of Gait: narrow LUIS, mild flexed trunk, assist to navigate RW, difficulty with turns and spatial awareness  Distance: 100' in fernando wearing mask, 15' in room  Stairs/Curb  Stairs?: No     Balance  Posture: Fair  Sitting - Static: Good  Sitting - Dynamic: Good  Standing - Static: Fair  Standing - Dynamic: Fair;-  Comments: standing balance at sink for ADL tasks with CGA        Plan   Plan  Times per week: 3-5  Times per day: Daily  Current Treatment Recommendations: Strengthening, Gait Training, ROM, Equipment Evaluation, Education, & procurement, Modalities, Pain Management, Neuromuscular Re-education, Balance Training, Functional Mobility Training, Endurance Training, Manual Therapy - Soft Tissue Mobilization, Home Exercise Program, Positioning, Transfer Training  Safety Devices  Type of devices:  All fall risk precautions in place, Call light within reach, Nurse notified, Left in chair      AM-PAC Score  AM-PAC Inpatient Mobility Raw Score : 18 (06/26/20 1502)  AM-PAC Inpatient T-Scale Score : 43.63 (06/26/20 1502)  Mobility Inpatient CMS 0-100% Score: 46.58 (06/26/20 1502)  Mobility Inpatient CMS G-Code Modifier : CK (06/26/20 1502)          Goals  Short term goals  Time Frame for Short term goals: discharge  Short term goal 1: pt will complete bed mobility MOD I  Short term goal 2: pt will complete sit to/from stand MOD I  Short term goal 3: pt will amb 200' with RW MOD I  Long term goals  Time Frame for Long term goals : LTG=STG       Therapy Time   Individual Concurrent Group Co-treatment   Time In 1213         Time Out 1255         Minutes 42         Timed Code Treatment Minutes: [address] [facility]

## 2020-06-26 NOTE — ED PROVIDER NOTES
As physician-in-triage, I performed a medical screening history and physical exam on Fabio Patel. I also cared for and evaluated the patient in conjunction with the ED Advanced Practice Provider. All diagnostic, treatment, and disposition decisions were made by myself in conjunction with the advanced practice provider. For all further details of the patient's emergency department visit, please see the advanced practice provider's documentation. Patient presents the ER for evaluation of persistent transient confusion headache acute on chronic known mass lesion with mild cerebral edema no hemorrhage, no infarct, no obvious seizure. Her antiepileptic agents were recently decreased from 1500 a day to 1000 mg daily. She has mild hypokalemia and other electrolyte deficits, she received analgesia antiemetic therapy repeat dosing of Decadron and Keppra. She will be admitted for further repeat clinical reevaluation. Mental status is intact mild word finding deficit, no active seizure no clinical signs of severe sepsis.       Impression: Altered mental status, brain mass, hydrocephalus, hypokalemia headache     Misty Rinaldi MD  14/48/12 [id number]

## 2020-06-26 NOTE — ADT AUTH CERT
Neurology [address] - [facility] Day 2 (6/25/2020) by Richard Baltazar, RN         Review Status Review Entered   Completed 6/26/2020 12:06       Criteria Review      Care Day: 2 Care Date: 6/25/2020 Level of Care:    Guideline Day 2    Level Of Care    ( ) Floor    6/26/2020 12:06 PM EDT by Brandy Medley      6/25  98.8  16  73  144/86  97%ra    wbc  4 .6    Clinical Status    ( ) * No ICU or intermediate care needs    Interventions    ( ) Inpatient interventions continue    6/26/2020 12:06 PM EDT by Brandy Medley      ivf @ 75 cc/hr  KCL iv is given K 3.9  ssi cont    confused, speech slowed, mri brain is ordered  Oncol consulted    ( ) Transition to oral routes    6/26/2020 12:06 PM EDT by Brandy Medley      decadron 4mg bid, keppra 1000mg bid    * Milestone   Additional Notes   6/2   Per Oncol:   History of seizures   - d/t brain tumor   - continue decadron 4 mg q 12hrs   - RN reports that the  stated that the patient had been recently taken off [address] by her neurologist because \"it wasn't working\", and they were planning on starting on a different medication. - upon further clarification with family by the RN, patient remains on [address] with plans to add Vimpat when approved.  Pt sees Dr. Calderon Murdock, neurology.     - keppra ordered, 1500 mg BID      Mri brain is ordered

## 2020-06-26 NOTE — PROGRESS NOTES
[facility]   Progress Note      6/26/2020 9:34 AM        Name: Falguni Gutierrez . Admitted: 6/24/2020    SUBJECTIVE:  She appears much better today, sitting up in chair,  at bedside, cognitively she appears to be back at her baseline.      Reviewed interval ancillary notes    Current Medications  levETIRAcetam (KEPPRA) tablet 1,000 mg, BID  0.9 % sodium chloride infusion, Continuous  sodium chloride flush 0.9 % injection 10 mL, 2 times per day  sodium chloride flush 0.9 % injection 10 mL, PRN  potassium chloride (KLOR-CON M) extended release tablet 40 mEq, PRN    Or  potassium bicarb-citric acid (EFFER-K) effervescent tablet 40 mEq, PRN    Or  potassium chloride 10 mEq/100 mL IVPB (Peripheral Line), PRN  acetaminophen (TYLENOL) tablet 650 mg, Q6H PRN    Or  acetaminophen (TYLENOL) suppository 650 mg, Q6H PRN  magnesium hydroxide (MILK OF MAGNESIA) 400 MG/5ML suspension 30 mL, Daily PRN  promethazine (PHENERGAN) tablet 12.5 mg, Q6H PRN    Or  ondansetron (ZOFRAN) injection 4 mg, Q6H PRN  famotidine (PEPCID) tablet 20 mg, BID PRN  enoxaparin (LOVENOX) injection 40 mg, Nightly  dexamethasone (DECADRON) tablet 4 mg, 2 times per day  0.9 % sodium chloride bolus, PRN  hydrALAZINE (APRESOLINE) injection 10 mg, Q6H PRN  insulin lispro (1 Unit Dial) 0-12 Units, TID WC  insulin lispro (1 Unit Dial) 0-6 Units, Nightly  glucose (GLUTOSE) 40 % oral gel 15 g, PRN  dextrose 50 % IV solution, PRN  glucagon (rDNA) injection 1 mg, PRN  dextrose 5 % solution, PRN        Objective:  /74   Pulse 85   Temp 98.9 °F (37.2 °C) (Oral)   Resp 18   Ht 5' 2\" (1.575 m)   Wt 228 lb 3.2 oz (103.5 kg)   SpO2 98%   BMI 41.74 kg/m²     Intake/Output Summary (Last 24 hours) at 6/26/2020 0992  Last data filed at 6/26/2020 0444  Gross per 24 hour   Intake 2142 ml   Output --   Net 2142 ml      Wt Readings from Last 3 Encounters:   06/24/20 228 lb 3.2 oz (103.5 kg)   02/26/20 234 lb (106.1 kg)       General appearance:  Appears comfortable  Eyes: Sclera clear. Pupils equal.  ENT: Moist oral mucosa. Trachea midline, no adenopathy. Cardiovascular: Regular rhythm, normal S1, S2. No murmur. No edema in lower extremities  Respiratory: Not using accessory muscles. Good inspiratory effort. Clear to auscultation bilaterally, no wheeze or crackles. GI: Abdomen soft, no tenderness, not distended  Musculoskeletal: No cyanosis in digits, neck supple  Neurology: CN 2-12 grossly intact. No speech or motor deficits  Psych: Normal affect. Alert and oriented in time, place and person  Skin: Warm, dry, normal turgor    Labs and Tests:  CBC:   Recent Labs     06/24/20  1503 06/25/20  0415   WBC 5.3 4.6   HGB 12.7 11.1*    160     BMP:    Recent Labs     06/24/20  1503 06/25/20  0415    142   K 2.9* 3.9    108   CO2 25 25   BUN 17 14   CREATININE 0.6 0.6   GLUCOSE 116* 149*     Hepatic:   Recent Labs     06/24/20  1503 06/25/20  0415   AST 24 16   ALT 33 26   BILITOT 0.6 0.4   ALKPHOS 55 51       ASSESSMENT AND PLAN    Principal Problem:    Astrocytoma brain tumor (HCC)  Active Problems:    Hydrocephalus (HCC)    Altered mental state    Hypokalemia    GERD (gastroesophageal reflux disease)    HTN (hypertension)  Resolved Problems:    * No resolved hospital problems. *      Anaplastic astrocytoma  - Completed concurrent with radiation 6000 cGy from 3/23/200 to 5/1/2020. Patient also had chemotherapy with Temodar 75 mg/m² daily. -MRI brain done on 6/1/2020 showed significant increase in heterogeneously enhancing lesions in the left frontal and parietal lobes consistent with tumor progression.  - MRI brain was discussed in the tumor board on 6/9/2020. This was thought to be pseudo-progression.  - currently on Temodar 150 mg/m²/day on days 1-5 every 28 days for 6-12 cycles.   - Check MRI brain today     History of seizures  - d/t brain tumor  - continue decadron 4 mg q 12hrs  - continue keppra, 1000 mg BID       Tyshawn Powell Bsahir Meneses, [address] [facility]  [facility]    Patient was seen and examined. She looks somewhat better. MRI brain was done today. It was reviewed by me. There is some progression on MRI. Discussed MRI results with patient's  over the phone. Discussed further plan-Best supportive care versus addition of Avastin to current treatment regimen. Patient's  wants to pursue treatments. Will make arrangements for patient to get Avastin as outpatient in the next 1 to 2 weeks. Okay to discharge from medical oncology perspective. Patient can be discharged on Decadron 4 mg p.o. twice daily and Keppra 1000 mg p.o. twice daily. Patient's neurologist is in the process of getting Vimpat approved through her insurance.     Kira Jimenes MD

## 2020-06-26 NOTE — PROGRESS NOTES
Progress Note - Dr. Marcelina Cheatham - Internal Medicine  PCP: MD Danielle Rosales Dr [id number] / [phone] [id number] [address] Day: 2  Code Status: Full Code  Current Diet: DIET GENERAL;        CC: follow up on medical issues    Subjective:   Felice Galindo is a 49 y.o. female. She denies problems    Continue to await MRI  No new neuro sx    She denies chest pain, denies shortness of breath, denies nausea,  denies emesis. 10 system Review of Systems is reviewed with patient, and pertinent positives are listed here: None . Otherwise, Review of systems is negative. I have reviewed the patient's medical and social history in detail and updated the computerized patient record. To recap: She  has a past medical history of Brain tumor [facility]), History of chemotherapy, History of radiation therapy, and Hypertension. . She  has a past surgical history that includes lipoma resection; Pilonidal cyst excision; and craniotomy (Right, 2/26/2020). . She  reports that she has never smoked. She has never used smokeless tobacco. She reports previous alcohol use. She reports that she does not use drugs. .        Active Hospital Problems    Diagnosis Date Noted    Hydrocephalus ([facility] [address] [G91.9] 06/24/2020    Altered mental state [R41.82] 06/24/2020    Hypokalemia [E87.6] 06/24/2020    GERD (gastroesophageal reflux disease) [K21.9] 06/24/2020    HTN (hypertension) [I10] 06/24/2020    Brain mass [G93.89] 02/26/2020       Current Facility-Administered Medications: levETIRAcetam (KEPPRA) tablet 1,000 mg, 1,000 mg, Oral, BID  0.9 % sodium chloride infusion, , Intravenous, Continuous  sodium chloride flush 0.9 % injection 10 mL, 10 mL, Intravenous, 2 times per day  sodium chloride flush 0.9 % injection 10 mL, 10 mL, Intravenous, PRN  potassium chloride (KLOR-CON M) extended release tablet 40 mEq, 40 mEq, Oral, PRN **OR** potassium bicarb-citric acid (EFFER-K) effervescent tablet 40 mEq, 40 mEq, Oral, PRN **OR** potassium chloride 10 mEq/100 mL IVPB (Peripheral Line), 10 mEq, Intravenous, PRN  acetaminophen (TYLENOL) tablet 650 mg, 650 mg, Oral, Q6H PRN **OR** acetaminophen (TYLENOL) suppository 650 mg, 650 mg, Rectal, Q6H PRN  magnesium hydroxide (MILK OF MAGNESIA) 400 MG/5ML suspension 30 mL, 30 mL, Oral, Daily PRN  promethazine (PHENERGAN) tablet 12.5 mg, 12.5 mg, Oral, Q6H PRN **OR** ondansetron (ZOFRAN) injection 4 mg, 4 mg, Intravenous, Q6H PRN  famotidine (PEPCID) tablet 20 mg, 20 mg, Oral, BID PRN  enoxaparin (LOVENOX) injection 40 mg, 40 mg, Subcutaneous, Nightly  dexamethasone (DECADRON) tablet 4 mg, 4 mg, Oral, 2 times per day  0.9 % sodium chloride bolus, 500 mL, Intravenous, PRN  hydrALAZINE (APRESOLINE) injection 10 mg, 10 mg, Intravenous, Q6H PRN  insulin lispro (1 Unit Dial) 0-12 Units, 0-12 Units, Subcutaneous, TID WC  insulin lispro (1 Unit Dial) 0-6 Units, 0-6 Units, Subcutaneous, Nightly  glucose (GLUTOSE) 40 % oral gel 15 g, 15 g, Oral, PRN  dextrose 50 % IV solution, 12.5 g, Intravenous, PRN  glucagon (rDNA) injection 1 mg, 1 mg, Intramuscular, PRN  dextrose 5 % solution, 100 mL/hr, Intravenous, PRN         Objective:  /74   Pulse 85   Temp 98.9 °F (37.2 °C) (Oral)   Resp 18   Ht 5' 2\" (1.575 m)   Wt 228 lb 3.2 oz (103.5 kg)   SpO2 98%   BMI 41.74 kg/m²      Patient Vitals for the past 24 hrs:   BP Temp Temp src Pulse Resp SpO2   06/26/20 0832 123/74 98.9 °F (37.2 °C) Oral 85 18 98 %   06/26/20 0442 (!) 141/94 98.6 °F (37 °C) Oral 69 18 98 %   06/25/20 2252 (!) 141/85 97.8 °F (36.6 °C) Oral 70 18 98 %   06/25/20 2045 (!) 144/86 98.8 °F (37.1 °C) Oral 72 16 97 %   06/25/20 1522 (!) 140/68 98.8 °F (37.1 °C) Oral 72 16 99 %   06/25/20 1211 130/75 98.1 °F (36.7 °C) Oral 73 15 98 %     Patient Vitals for the past 96 hrs (Last 3 readings):   Weight   06/24/20 1844 228 lb 3.2 oz (103.5 kg)   06/24/20 1304 234 lb (106.1 kg)           Intake/Output Summary (Last 24 hours) at 6/26/2020 1201 [facility] filed at 6/26/2020 0444  Gross per 24 hour   Intake 2142 ml   Output --   Net 2142 ml         Physical Exam:   S1, S2 normal, no murmur, rub or gallop, regular rate and rhythm  clear to auscultation bilaterally  abdomen is soft without significant tenderness, masses, organomegaly or guarding  extremities normal, atraumatic, no cyanosis or edema    Labs:  Lab Results   Component Value Date    WBC 4.6 06/25/2020    HGB 11.1 (L) 06/25/2020    HCT 31.9 (L) 06/25/2020     06/25/2020    ALT 26 06/25/2020    AST 16 06/25/2020     06/25/2020    K 3.9 06/25/2020     06/25/2020    CREATININE 0.6 06/25/2020    BUN 14 06/25/2020    CO2 25 06/25/2020    INR 1.18 (H) 02/27/2020    LABA1C 4.4 06/24/2020    LABMICR YES 06/24/2020     Lab Results   Component Value Date    TROPONINI <0.01 06/24/2020       Recent Imaging Results are Reviewed:  Ct Head Wo Contrast    Result Date: 6/24/2020  EXAMINATION: CT OF THE HEAD WITHOUT CONTRAST  6/24/2020 12:19 pm TECHNIQUE: CT of the head was performed without the administration of intravenous contrast. Dose modulation, iterative reconstruction, and/or weight based adjustment of the mA/kV was utilized to reduce the radiation dose to as low as reasonably achievable. COMPARISON: 02/26/2020 Brain MRI, 06/01/2020 HISTORY: ORDERING SYSTEM PROVIDED HISTORY: ams TECHNOLOGIST PROVIDED HISTORY: Reason for exam:->ams Has a \"code stroke\" or \"stroke alert\" been called? ->No Is the patient pregnant?->No Reason for Exam: ams Acuity: Unknown Type of Exam: Unknown FINDINGS: BRAIN/VENTRICLES: There is redemonstration of a heterogeneous mass which is again noted be centered within splenium of the corpus callosum, with extension to both cerebral hemispheres. The size that appears basically unchanged when compared to the MRI performed 06/01/2020, measuring roughly 6.7 cm maximally. Multiple internal calcifications are again identified.  There is degenerative edema is identified surrounding that lesion, similar to mildly increased when compared to the most recent brain MRI. There is hydrocephalus of the right temporal horn, and that has increased when compared to the brain MRI from earlier this month. The basilar cisterns remain patent. Intracranial vasculature is unremarkable in appearance. No acute loss of the gray-white matter differentiation is identified to suggest acute or subacute infarct. ORBITS: The visualized portion of the orbits demonstrate no acute abnormality. SINUSES: The visualized paranasal sinuses and mastoid air cells demonstrate no acute abnormality. SOFT TISSUES/SKULL:  Right parietal craniectomy again seen. Calvarium is otherwise unremarkable. Redemonstration of a large heterogeneous mass centered on the splenium of the corpus callosum. The absolute size that appears roughly unchanged. The only notable change between the current examination on MRI from 06/01/2020 is increased hydrocephalus of right temporal horn, which suggests increasing obstruction of that ventricle. Xr Chest Portable    Result Date: 6/24/2020  EXAMINATION: ONE XRAY VIEW OF THE CHEST 6/24/2020 3:30 pm COMPARISON: None. HISTORY: ORDERING SYSTEM PROVIDED HISTORY: ams TECHNOLOGIST PROVIDED HISTORY: Reason for exam:->ams Reason for Exam: AMS, unable to do basic everyday needs, emesis started today. finished round 2 chemo/radiation 1 week ago Acuity: Acute Type of Exam: Initial FINDINGS: HEART/MEDIASTINUM: The cardiomediastinal silhouette is within normal limits. PLEURA/LUNGS: There are low lung volumes. There are no focal consolidations or pleural effusions. There is no appreciable pneumothorax. BONES/SOFT TISSUE: No acute abnormality. No radiographic evidence of acute pulmonary disease.      Mri Brain W Wo Contrast    Result Date: 6/1/2020  EXAM: MRI BRAIN W WO CONTRAST INDICATION: Astrocytoma follow-up COMPARISON: 2/26/2020 TECHNIQUE: Multiplanar, multisequence MR imaging of the head obtained without and with IV. IV contrast: IV ProHance. FINDINGS: There is no diffusion restriction. There is no acute intracranial hemorrhage. There is no extra-axial fluid collection. Ventricular system is stable. 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 It measures approximately 7.0 x 6.0 x 4.5 cm (transverse by AP by craniocaudal dimension) in comparison to 7.0 x 5.9 x 4.1 cm in the prior exam. There is moderate surrounding T2 FLAIR hyperintensity/vasogenic edema, essentially unchanged. There is local regional mass effect. There is effacement of the posterior horn/trigone of bilateral lateral ventricles, right greater than left. There is intraventricular extension of tumor into the right lateral ventricle. Stable mild ventriculomegaly. Previously seen punctate enhancing focus in left frontal lobe now measures 2.0 x 1.1 cm (image 66 of series 24) with mild surrounding T2 FLAIR hyperintensity. Previously seen punctate enhancing focus in left parietal lobe now measures 2.5 x 1.3 cm (image 69) with surrounding T2 FLAIR hyperintensity. Previously seen punctate enhancing focus in posterior left parietal lobe now measures 2.6 x 1.9 cm (image 80 of series 24). There is surrounding T2 FLAIR hyperintensity. 1. 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 2. Interval significant increase in size of heterogeneously enhancing lesions in the left frontal and parietal lobes consistent with tumor progression. Assessment and Plan:  Principal Problem:    Brain mass -Established problem. Stable. Known anaplastic astrocytoma  Plan: per Dr Rupinder Hernandez.  Await repeat MRI (ordered 6/24)  Active Problems:    Hydrocephalus ([facility] [address]

## 2020-06-26 NOTE — PROGRESS NOTES
New IV started. Shift assessment complete, nightly meds administered. No further needs expressed at this time.

## 2020-06-26 NOTE — PROGRESS NOTES
Occupational Therapy   Occupational Therapy Initial Assessment  Date: 2020   Patient Name: Laura Robbins  MRN: [id number]     : 1965    Date of Service: 2020    Discharge Recommendations: Laura Robbins scored a 18/24 on the AM-PAC ADL Inpatient form. Current research shows that an AM-PAC score of 18 or greater is typically associated with a discharge to the patient's home setting. Based on the patient's AM-PAC score, and their current ADL deficits, it is recommended that the patient have 2-3 sessions per week of Occupational Therapy at d/c to increase the patient's independence. At this time, this patient demonstrates the endurance and safety to discharge home with home health services and a follow up treatment frequency of 2-3x/wk. Please see assessment section for further patient specific details. If patient discharges prior to next session this note will serve as a discharge summary. Please see below for the latest assessment towards goals. HOME HEALTH CARE: LEVEL 3 SAFETY     - Initial home health evaluation to occur within 24-48 hours, in patient home   - Therapy evaluations in home within 24-48 hours of discharge; including DME and home safety   - Frontload therapy 5 days, then 3x a week   - Therapy to evaluate if patient has [address] needs for personal care   -  evaluation within 24-48 hours, includes evaluation of resources and insurance to determine [place], [place], LTC, and Medicaid options        OT Equipment Recommendations  Equipment Needed: Yes  Mobility Devices: ADL Assistive Devices  ADL Assistive Devices: Grab Bars - shower;Grab Bars - toilet;Hand-held Shower    Assessment   Performance deficits / Impairments: Decreased cognition;Decreased high-level IADLs;Decreased functional mobility ; Decreased ADL status; Decreased coordination;Decreased balance;Decreased safe awareness  Assessment: Pt is limited in the above areas impacting safety and independence in functional mobility and ADLs. Pt would benefit from skilled inpatient OT services to address these deficits. Treatment Diagnosis: Decreased functional status secondary to hydrocephalus and brain tumor  Prognosis: Fair;Good  Decision Making: Medium Complexity  OT Education: OT Role;Plan of Care;Transfer Training;Orientation  Patient Education: d/c planning- pt verbalized understanding  Barriers to Learning: cognition  REQUIRES OT FOLLOW UP: Yes  Activity Tolerance  Activity Tolerance: Patient Tolerated treatment well;Treatment limited secondary to decreased cognition  Safety Devices  Safety Devices in place: Yes  Type of devices: Nurse notified; Chair alarm in place;Call light within reach; Left in chair; All fall risk precautions in place  Restraints  Initially in place: No           Patient Diagnosis(es): The primary encounter diagnosis was Altered mental status, unspecified altered mental status type. Diagnoses of Hypokalemia, Hydrocephalus, unspecified type ([address] and Brain mass were also pertinent to this visit. has a past medical history of Brain tumor [facility]), History of chemotherapy, History of radiation therapy, and Hypertension. has a past surgical history that includes lipoma resection; Pilonidal cyst excision; and craniotomy (Right, 2/26/2020). Treatment Diagnosis: Decreased functional status secondary to hydrocephalus and brain tumor      Restrictions  Restrictions/Precautions  Restrictions/Precautions: Fall Risk(high fall risk)  Position Activity Restriction  Other position/activity restrictions: Timur Cannon is a 49 y.o. female with history of brain tumor who presents to the emergency department with  at bedside who states that patient has been altered since yesterday. She has been increasingly confused today. When she arrived to the emergency department. She reportedly had a headache, nausea and vomiting.    gave the patient a dose of Vicodin but he is not sure if she kept this down or vomited it back up. The patient is not complaining of any pain or persistent nausea at this time. She takes dexamethasone and Keppra for the brain tumor. She has had seizure-like activity secondary to the brain tumor. She has finished radiation and is still doing chemotherapy. Her second round of chemotherapy was completed 1 week ago. Subjective   General  Chart Reviewed: Yes  Patient assessed for rehabilitation services?: Yes  Additional Pertinent Hx: Anaplastic astrocytoma  Family / Caregiver Present: No  Diagnosis: hydrocephalus  Subjective  Subjective: Pt seated EOB with eyes closed upon entry; agreeable to eval.  Patient Currently in Pain: Denies  Vital Signs  Temp: 98.8 °F (37.1 °C)  Temp Source: Oral  Pulse: 85  Heart Rate Source: Monitor  Resp: 17  BP: (!) 146/99  BP Location: Right lower arm  BP Upper/Lower: Lower  Patient Position: Sitting;Up in chair  Patient Currently in Pain: Denies  Oxygen Therapy  SpO2: 97 %  O2 Device: None (Room air)  Social/Functional History  Social/Functional History  Lives With: Family, Spouse(; parents are \"in and out\"; plans to d/c to apt with , not to house with parents)  Type of Home: Apartment  Home Access: Elevator, Stairs to enter with rails  Entrance Stairs - Number of Steps: 1 flight of stairs to second floor apt  Entrance Stairs - Rails: Both  Bathroom Shower/Tub: Tub/Shower unit  Bathroom Toilet: Standard  Bathroom Equipment: Built-in shower seat  Home Equipment: Rolling walker, 4 wheeled walker  Receives Help From: Family  ADL Assistance: Independent  Homemaking Assistance: Independent  Homemaking Responsibilities: Yes  Ambulation Assistance: Independent  Transfer Assistance: Independent  Active : Yes  Additional Comments: Pt reports she has not had any recent falls.        Objective   Vision: Impaired  Vision Exceptions: Wears glasses at all times(contacts)  Hearing: Within functional limits    Orientation  Overall Orientation Status: Impaired  Orientation Level: Oriented to person;Oriented to place;Oriented to situation;Disoriented to time(unable to describe situation, but appears oriented with prompts)  Observation/Palpation  Posture: Good  Observation: difficulty maintaining eye contact  Balance  Sitting Balance: Supervision  Standing Balance: Contact guard assistance  Standing Balance  Time: ~12 min total  Activity: ADLs, functional mobility, transfers  Comment: RW and no device  Functional Mobility  Functional - Mobility Device: Rolling Walker  Activity: To/from bathroom; Other  Assist Level: Minimal assistance  Functional Mobility Comments: ~175 ft in [place] with RW; some mobility in room without device; physical and verbal cues  Toilet Transfers  Toilet - Technique: Ambulating  Equipment Used: Standard toilet  Toilet Transfer: Minimal assistance  Toilet Transfers Comments: physical and verbal cues to locate toilet prior to sitting  ADL  Grooming: Stand by assistance(oral care in stance at sink)  LE Dressing: Contact guard assistance(donning/doffing brief)  Toileting: Contact guard assistance(pericare and brief management at toilet)  Additional Comments: Pt limited in ADLs due to deficits in cognition and motor planning. Tone RUE  RUE Tone: Normotonic  Tone LUE  LUE Tone: Normotonic  Coordination  Movements Are Fluid And Coordinated: No  Coordination and Movement description: Decreased accuracy; Decreased speed; Apraxia     Bed mobility  Comment: Not assessed due to pt seated EOB upon entry and in chair upon exit  Transfers  Sit to stand: Minimal assistance  Stand to sit: Minimal assistance  Transfer Comments: Pt with physical and verbal cues to sit on correct area of chair; pt attempted to sit on arm rest  Vision - Basic Assessment  Prior Vision: Wears contacts  Patient Visual Report: No visual complaint reported.   Cognition  Overall Cognitive Status: Exceptions  Arousal/Alertness: Delayed responses to stimuli;Inconsistent responses to stimuli  Following Commands: Follows one step commands with repetition; Follows one step commands with increased time; Inconsistently follows commands  Attention Span: Difficulty dividing attention  Memory: Decreased short term memory;Decreased recall of recent events  Safety Judgement: Decreased awareness of need for assistance;Decreased awareness of need for safety  Problem Solving: Assistance required to generate solutions;Assistance required to identify errors made;Assistance required to correct errors made;Assistance required to implement solutions  Insights: Decreased awareness of deficits  Initiation: Requires cues for some  Sequencing: Requires cues for some  Perception  Overall Perceptual Status: Impaired  Initiation: Cues to initiate tasks  Motor Planning: Cues to use objects appropriately     Sensation  Overall Sensation Status: WFL        LUE AROM (degrees)  LUE AROM : WFL  Left Hand AROM (degrees)  Left Hand AROM: WFL  RUE AROM (degrees)  RUE AROM : WFL  Right Hand AROM (degrees)  Right Hand AROM: WFL  LUE Strength  Gross LUE Strength: WFL  RUE Strength  Gross RUE Strength: WFL                   Plan   Plan  Times per week: 3-5  Times per day: Daily  Current Treatment Recommendations: Strengthening, Safety Education & Training, Balance Training, Patient/Caregiver Education & Training, Self-Care / ADL, Home Management Training, Functional Mobility Training, Cognitive Reorientation, Endurance Training, Neuromuscular Re-education, Cognitive/Perceptual Training    G-Code     OutComes Score                                                  AM-PAC Score        AM-[facility] Inpatient Daily Activity Raw Score: 18 (06/26/20 [facility])  AM-PAC Inpatient ADL T-Scale Score : 38.66 (06/26/20 [facility])  ADL Inpatient CMS 0-100% Score: 46.65 (06/26/20 [facility])  ADL Inpatient CMS G-Code Modifier : CK (06/26/20 [facility])    Goals  Short term goals  Time Frame for Short term goals: d/c  Short term goal 1: Pt will complete functional mobility with supervision with required AD. Short term goal 2: Pt will complete toileting with supervision. Short term goal 3: Pt will complete functional transfer with supervision.   Short term goal 4: Pt will complete grooming ADLs mod I.  Long term goals  Time Frame for Long term goals : LTG=STG  Patient Goals   Patient goals : Pt reports she would like to d/c to apt with        Therapy Time   Individual Concurrent Group Co-treatment   Time In 1213         Time Out 1255         Minutes 42         Timed Code Treatment Minutes: Kaylynn Garcia [address], [address]

## 2020-06-27 VITALS
DIASTOLIC BLOOD PRESSURE: 85 MMHG | BODY MASS INDEX: 41.99 KG/M2 | WEIGHT: 228.2 LBS | HEART RATE: 82 BPM | HEIGHT: 62 IN | SYSTOLIC BLOOD PRESSURE: 155 MMHG | TEMPERATURE: 99.3 F | OXYGEN SATURATION: 98 % | RESPIRATION RATE: 16 BRPM

## 2020-06-27 LAB
ANION GAP SERPL CALCULATED.3IONS-SCNC: 8 MMOL/L (ref 3–16)
BASOPHILS ABSOLUTE: 0 K/UL (ref 0–0.2)
BASOPHILS RELATIVE PERCENT: 0.3 %
BUN BLDV-MCNC: 11 MG/DL (ref 7–20)
CALCIUM SERPL-MCNC: 9.2 MG/DL (ref 8.3–10.6)
CHLORIDE BLD-SCNC: 112 MMOL/L (ref 99–110)
CO2: 26 MMOL/L (ref 21–32)
CREAT SERPL-MCNC: 0.6 MG/DL (ref 0.6–1.1)
EOSINOPHILS ABSOLUTE: 0 K/UL (ref 0–0.6)
EOSINOPHILS RELATIVE PERCENT: 0.8 %
EPITHELIAL CELLS, UA: NORMAL /HPF (ref 0–5)
GFR AFRICAN AMERICAN: >60
GFR NON-AFRICAN AMERICAN: >60
GLUCOSE BLD-MCNC: 102 MG/DL (ref 70–99)
GLUCOSE BLD-MCNC: 86 MG/DL (ref 70–99)
GLUCOSE BLD-MCNC: 94 MG/DL (ref 70–99)
HCT VFR BLD CALC: 31.2 % (ref 36–48)
HEMOGLOBIN: 10.9 G/DL (ref 12–16)
LYMPHOCYTES ABSOLUTE: 0.8 K/UL (ref 1–5.1)
LYMPHOCYTES RELATIVE PERCENT: 23.3 %
MCH RBC QN AUTO: 38.4 PG (ref 26–34)
MCHC RBC AUTO-ENTMCNC: 35 G/DL (ref 31–36)
MCV RBC AUTO: 109.7 FL (ref 80–100)
MONOCYTES ABSOLUTE: 0.4 K/UL (ref 0–1.3)
MONOCYTES RELATIVE PERCENT: 12.1 %
NEUTROPHILS ABSOLUTE: 2.1 K/UL (ref 1.7–7.7)
NEUTROPHILS RELATIVE PERCENT: 63.5 %
PDW BLD-RTO: 19.5 % (ref 12.4–15.4)
PERFORMED ON: NORMAL
PERFORMED ON: NORMAL
PLATELET # BLD: 164 K/UL (ref 135–450)
PMV BLD AUTO: 7 FL (ref 5–10.5)
POTASSIUM SERPL-SCNC: 4.3 MMOL/L (ref 3.5–5.1)
RBC # BLD: 2.85 M/UL (ref 4–5.2)
RBC UA: NORMAL /HPF (ref 0–4)
SODIUM BLD-SCNC: 146 MMOL/L (ref 136–145)
WBC # BLD: 3.3 K/UL (ref 4–11)
WBC UA: NORMAL /HPF (ref 0–5)

## 2020-06-27 PROCEDURE — 36415 COLL VENOUS BLD VENIPUNCTURE: CPT

## 2020-06-27 PROCEDURE — 80048 BASIC METABOLIC PNL TOTAL CA: CPT

## 2020-06-27 PROCEDURE — 85025 COMPLETE CBC W/AUTO DIFF WBC: CPT

## 2020-06-27 PROCEDURE — 2580000003 HC RX 258: Performed by: INTERNAL MEDICINE

## 2020-06-27 PROCEDURE — 6360000002 HC RX W HCPCS: Performed by: INTERNAL MEDICINE

## 2020-06-27 PROCEDURE — 6370000000 HC RX 637 (ALT 250 FOR IP): Performed by: INTERNAL MEDICINE

## 2020-06-27 RX ORDER — POTASSIUM CHLORIDE 20 MEQ/1
20 TABLET, EXTENDED RELEASE ORAL DAILY
Qty: 30 TABLET | Refills: 0 | Status: SHIPPED | OUTPATIENT
Start: 2020-06-27 | End: 2021-07-27

## 2020-06-27 RX ORDER — DEXAMETHASONE 4 MG/1
4 TABLET ORAL 2 TIMES DAILY WITH MEALS
Qty: 60 TABLET | Refills: 0
Start: 2020-06-27 | End: 2021-07-27

## 2020-06-27 RX ADMIN — SODIUM CHLORIDE: 9 INJECTION, SOLUTION INTRAVENOUS at 03:12

## 2020-06-27 RX ADMIN — LEVETIRACETAM 1000 MG: 500 TABLET ORAL at 10:39

## 2020-06-27 RX ADMIN — DEXAMETHASONE 4 MG: 4 TABLET ORAL at 10:40

## 2020-06-27 RX ADMIN — Medication 10 ML: at 10:40

## 2020-06-27 ASSESSMENT — PAIN SCALES - GENERAL: PAINLEVEL_OUTOF10: 0

## 2020-06-27 NOTE — PROGRESS NOTES
Assisted to BR for void, back to bed. Seems disoriented, walked toward shower instead of toilet, had to be redirected. Also forgets where paper towels are and has to be directed each trip to BR. Bed alarm active, spouse remains @bedside.

## 2020-06-27 NOTE — PROGRESS NOTES
1030  Patient assessment complete. Took medications without difficulties. Denies any needs at this time. Care plan and education reviewed and agreed upon with patient. Bed in low position, call light within reach and bed alarm on.  at bedside. Will continue to monitor. 1410  Patient discharged home. Reviewed follow-up appointments and discharge instructions with the patient and . All belongings sent with the patient.

## 2020-06-27 NOTE — PLAN OF CARE
Problem: Skin Integrity:  Goal: Will show no infection signs and symptoms  Description: Will show no infection signs and symptoms  Outcome: Completed  Goal: Absence of new skin breakdown  Description: Absence of new skin breakdown  Outcome: Completed     Problem: Neurological  Goal: Maximum potential motor/sensory/cognitive function  6/27/2020 1054 by Dennys Delgado  Outcome: Completed  6/27/2020 0543 by Ariella Parra RN  Outcome: Ongoing  Note: Remains forgetful this shift, especially upon awakening, with flat affect.

## 2020-06-27 NOTE — DISCHARGE SUMMARY
[facility] -- Physician Discharge Summary     [facility]  1965  MRN: [id number]    Admit Date: 6/24/2020  Discharge Date: No discharge date for patient encounter. 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 o. female with history of brain tumor who presents to the emergency department with  at bedside who states that patient has been altered since yesterday. Thanh Chavarria has been increasingly confused today.  When she arrived to the emergency department. Thanh Chavarria reportedly had a headache, nausea and vomiting.   gave the patient a dose of Vicodin but he is not sure if she kept this down or vomited it back up.  The patient is not complaining of any pain or persistent nausea at this time.  She takes dexamethasone and Keppra for the brain tumor.  She has had seizure-like activity secondary to the brain tumor.  She has finished radiation and is still doing chemotherapy. Madeline Gregory second round of chemotherapy was completed 1 week ago. Madeline Gregory oncologist is Dr. Porter Vines neurologist is Dr. Marjorie Garcia with [facility] neurology group. CT Head from ER is reviewed by self on computer     Impression:       Redemonstration of a large heterogeneous mass centered on the splenium of the  corpus callosum.  The absolute size that appears roughly unchanged.          She is admitted, seen by her oncologist    MRI brain done   Impression:       Large dominant solid enhancing mass involving both parietal lobes and  splenium of the corpus callosum measuring 6.8 x 5.7 cm previously measuring  7.0 x 6.0 cm.  The lesion is in keeping with the history of astrocytoma. There is mild surrounding vasogenic edema which has slightly increased since  previous exam.    Both enhancing masses within the left parietal lobe and the enhancing mass  within the left posterior frontal lobe have slightly increased in size and  demonstrate some new mild vasogenic edema compatible with microscopic spread. Two new punctate enhancing masses within the left occipital lobe measuring 3  mm and 5 mm compatible with additional multifocal spread. Pt with cerebral edema seen  Treated with iv decadron      Per discussion with oncologist  There is some progression on MRI. Discussed MRI results with patient's  over the phone. Discussed further plan-Best supportive care versus addition of Avastin to current treatment regimen. Patient's  wants to pursue treatments. Will make arrangements for patient to get Avastin as outpatient in the next 1 to 2 weeks. Okay to discharge from medical oncology perspective. Patient can be discharged on Decadron p.o. twice daily and Keppra 1000 mg p.o. twice daily.     Consults made during Hospitalization:  IP CONSULT TO INTERNAL MEDICINE  IP CONSULT TO HEM/ONC  PALLIATIVE CARE EVAL    Treatment team at time of Discharge: Treatment Team: Attending Provider: Lian Huntley MD; Consulting Physician: Lian Huntley MD; Consulting Physician: Franny Bhatia MD; Utilization Reviewer: Compa Tate RN; Utilization Reviewer: Abbey Baig RN; : KEV Dahl; Respiratory Therapist (Day): Phillip Barraza; Registered Nurse: Filomena Moreno; Utilization Reviewer: Antoni Freeman RN    Imaging Results:  Ct Head Wo Contrast    Result Date: 6/24/2020  EXAMINATION: CT OF THE HEAD WITHOUT CONTRAST  6/24/2020 12:19 pm TECHNIQUE: CT of the head was performed without the administration of intravenous contrast. Dose modulation, iterative reconstruction, and/or weight based adjustment of the mA/kV was utilized to reduce the radiation dose to as low as reasonably achievable. COMPARISON: 02/26/2020 Brain MRI, 06/01/2020 HISTORY: ORDERING SYSTEM PROVIDED HISTORY: ams TECHNOLOGIST PROVIDED HISTORY: Reason for exam:->ams Has a \"code stroke\" or \"stroke alert\" been called? ->No Is the patient pregnant?->No Reason for Exam: ams Acuity: Unknown Type of Exam: Unknown FINDINGS: BRAIN/VENTRICLES: There is redemonstration of a heterogeneous mass which is again noted be centered within splenium of the corpus callosum, with extension to both cerebral hemispheres. The size that appears basically unchanged when compared to the MRI performed 06/01/2020, measuring roughly 6.7 cm maximally. Multiple internal calcifications are again identified. There is degenerative edema is identified surrounding that lesion, similar to mildly increased when compared to the most recent brain MRI. There is hydrocephalus of the right temporal horn, and that has increased when compared to the brain MRI from earlier this month. The basilar cisterns remain patent. Intracranial vasculature is unremarkable in appearance. No acute loss of the gray-white matter differentiation is identified to suggest acute or subacute infarct. ORBITS: The visualized portion of the orbits demonstrate no acute abnormality. SINUSES: The visualized paranasal sinuses and mastoid air cells demonstrate no acute abnormality. SOFT TISSUES/SKULL:  Right parietal craniectomy again seen. Calvarium is otherwise unremarkable. Redemonstration of a large heterogeneous mass centered on the splenium of the corpus callosum. The absolute size that appears roughly unchanged.  The only notable change between the current examination on MRI from 06/01/2020 is increased hydrocephalus of right temporal horn, which suggests increasing obstruction of that ventricle. Xr Chest Portable    Result Date: 6/24/2020  EXAMINATION: ONE XRAY VIEW OF THE CHEST 6/24/2020 3:30 pm COMPARISON: None. HISTORY: ORDERING SYSTEM PROVIDED HISTORY: ams TECHNOLOGIST PROVIDED HISTORY: Reason for exam:->ams Reason for Exam: AMS, unable to do basic everyday needs, emesis started today. finished round 2 chemo/radiation 1 week ago Acuity: Acute Type of Exam: Initial FINDINGS: HEART/MEDIASTINUM: The cardiomediastinal silhouette is within normal limits. PLEURA/LUNGS: There are low lung volumes. There are no focal consolidations or pleural effusions. There is no appreciable pneumothorax. BONES/SOFT TISSUE: No acute abnormality. No radiographic evidence of acute pulmonary disease. Mri Brain W Wo Contrast    Result Date: 6/26/2020  EXAMINATION: MRI OF THE BRAIN WITHOUT AND WITH CONTRAST  6/26/2020 10:35 am TECHNIQUE: Multiplanar multisequence MRI of the head/brain was performed without and with the administration of intravenous contrast. COMPARISON: 06/01/2020 HISTORY: ORDERING SYSTEM PROVIDED HISTORY: Astrocytoma TECHNOLOGIST PROVIDED HISTORY: Reason for exam:->Astrocytoma Is the patient pregnant?->No Reason for Exam: HX OF ASTROCYTOMA EVALUATE STATUS Acuity: Acute Type of Exam: Initial Additional signs and symptoms: HX OF ASTROCYTOMA EVALUATE STATUS Relevant Medical/Surgical History: HX OF ASTROCYTOMA EVALUATE STATUS FINDINGS: INTRACRANIAL STRUCTURES/VENTRICLES:  There is no acute infarct. No evidence of an acute intracranial hemorrhage. The ventricles and sulci are normal in size and configuration. The sellar/suprasellar regions appear unremarkable. The normal signal voids within the major intracranial vessels appear maintained.  There is a large irregular solid enhancing mass posteriorly in the midline of the brain involving both parietal lobes and splenium of the corpus callosum. The lesion crosses the midline and measures 6.8 x 5.7 cm. There is mass effect upon the atria of both lateral ventricles and right occipital horn. No hydrocephalus is evident. Mild surrounding vasogenic edema is appreciated. Two solid enhancing masses are identified within the left parietal lobe with development of new mild increased vasogenic edema. One of the lesions measures 3.0 x 1.8 cm previously measuring 2.5 x 1.3 cm. The second lesion measures 3.0 x 2.0 cm previously measuring 2.6 x 1.9 cm. Enhancing mass within the posterior left frontal lobe measures 1.7 x 1.4 cm has slightly increased in size and demonstrates slight increased vasogenic edema. There are 2 new foci of enhancement within the left occipital lobe measuring 3 mm and 5 mm. Right parietal craniotomy is identified. ORBITS: The visualized portion of the orbits demonstrate no acute abnormality. SINUSES: The visualized paranasal sinuses and mastoid air cells are well aerated. BONES/SOFT TISSUES: The bone marrow signal intensity appears normal. The soft tissues demonstrate no acute abnormality. Large dominant solid enhancing mass involving both parietal lobes and splenium of the corpus callosum measuring 6.8 x 5.7 cm previously measuring 7.0 x 6.0 cm. The lesion is in keeping with the history of astrocytoma. There is mild surrounding vasogenic edema which has slightly increased since previous exam. Both enhancing masses within the left parietal lobe and the enhancing mass within the left posterior frontal lobe have slightly increased in size and demonstrate some new mild vasogenic edema compatible with microscopic spread. Two new punctate enhancing masses within the left occipital lobe measuring 3 mm and 5 mm compatible with additional multifocal spread.      Mri Brain W Wo Contrast    Result Date: 6/1/2020  EXAM: MRI BRAIN W WO CONTRAST INDICATION: Astrocytoma follow-up COMPARISON: 2/26/2020 TECHNIQUE: Multiplanar, multisequence MR imaging of the head obtained without and with IV. IV contrast: IV ProHance. FINDINGS: There is no diffusion restriction. There is no acute intracranial hemorrhage. There is no extra-axial fluid collection. Ventricular system is stable. 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 It measures approximately 7.0 x 6.0 x 4.5 cm (transverse by AP by craniocaudal dimension) in comparison to 7.0 x 5.9 x 4.1 cm in the prior exam. There is moderate surrounding T2 FLAIR hyperintensity/vasogenic edema, essentially unchanged. There is local regional mass effect. There is effacement of the posterior horn/trigone of bilateral lateral ventricles, right greater than left. There is intraventricular extension of tumor into the right lateral ventricle. Stable mild ventriculomegaly. Previously seen punctate enhancing focus in left frontal lobe now measures 2.0 x 1.1 cm (image 66 of series 24) with mild surrounding T2 FLAIR hyperintensity. Previously seen punctate enhancing focus in left parietal lobe now measures 2.5 x 1.3 cm (image 69) with surrounding T2 FLAIR hyperintensity. Previously seen punctate enhancing focus in posterior left parietal lobe now measures 2.6 x 1.9 cm (image 80 of series 24). There is surrounding T2 FLAIR hyperintensity. 1. 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 2. Interval significant increase in size of heterogeneously enhancing lesions in the left frontal and parietal lobes consistent with tumor progression.          Discharge Exam:  /84   Pulse 67   Temp 97.8 °F (36.6 °C) (Axillary)   Resp 18   Ht 5' 2\" (1.575 m)   Wt 228 lb 3.2 oz (103.5 kg)   SpO2 100%   BMI 41.74 kg/m² General appearance: alert, appears stated age and cooperative  Head: Normocephalic, without obvious abnormality, atraumatic  Lungs: clear to auscultation bilaterally  Heart: regular rate and rhythm, S1, S2 normal, no murmur, click, rub or gallop  Abdomen: soft, non-tender; bowel sounds normal; no masses,  no organomegaly  Extremities: extremities normal, atraumatic, no cyanosis or edema  Pulses: 2+ and symmetric    Disposition: home    Condition: stable    Discharge Medications:   Kedric Blizzard   Home Medication Instructions PEB:[id number]    Printed on:06/27/20 8645   Medication Information                      benazepril-hydrochlorthiazide (LOTENSIN HCT) 20-25 MG per tablet  Take 1 tablet by mouth daily              dexamethasone (DECADRON) 4 MG tablet  Take 1 tablet by mouth 2 times daily (with meals)             docusate sodium (COLACE) 100 MG capsule  Take 100 mg by mouth daily             levETIRAcetam (KEPPRA) 500 MG tablet  Take 1,000 mg by mouth 2 times daily              pantoprazole (PROTONIX) 40 MG tablet  Take 1 tablet by mouth every morning (before breakfast)             potassium chloride (KLOR-CON M) 20 MEQ extended release tablet  Take 1 tablet by mouth daily             venlafaxine (EFFEXOR XR) 75 MG extended release capsule  Take 75 mg by mouth daily                 Allergies:  No Known Allergies    Follow up Instructions: Follow-up with PCP: Lamin Benitez MD in 2 wk .       Total time spent on day of discharge including face-to-face visit, examination, documentation, counseling, preparation of discharge plans and followup, and discharge medicine reconciliation and presciptions is 36 minutes    Signed:  Sukh Mora MD  6/27/2020

## 2020-06-27 NOTE — PLAN OF CARE
Problem: Falls - Risk of:  Goal: Will remain free from falls  Description: Will remain free from falls  Outcome: Ongoing  Note: Remains free from falls, precautions in place. Problem: Neurological  Goal: Maximum potential motor/sensory/cognitive function  Outcome: Ongoing  Note: Remains forgetful this shift, especially upon awakening, with flat affect.

## 2020-06-29 NOTE — DISCHARGE INSTR - COC
Continuity of Care Form    Patient Name: Asael Oliva   :  1965  MRN:  [id number]    Admit date:  2020  Discharge date:  ***    Code Status Order: Prior   Advance Directives:   Michael Kang 33 Directive Type of Healthcare Directive Copy in [address] Agent's Name Healthcare Agent's Phone Number    20  No, patient does not have an advance directive for healthcare treatment -- -- -- -- --          Admitting Physician:  Eusebio Argueta MD  PCP: Can Chaparro MD    Discharging Nurse: [facility] Unit/Room#: 4MA-1453/9517-55  Discharging Unit Phone Number: ***    Emergency Contact:   Extended Emergency Contact Information  Primary Emergency Contact: emerson moreno  Home Phone: [phone]  Relation: Spouse  Secondary Emergency Contact: jamil alvarez  Home Phone: [phone]  Relation: Brother/Sister    Past Surgical History:  Past Surgical History:   Procedure Laterality Date    CRANIOTOMY Right 2020    RIGHT PARIETAL CRANIOTOMY FOR STEREOTACTIC BIOPSY OF BRAIN MASS performed by Choco Mcwilliams MD at [facility] CYST EXCISION         Immunization History: There is no immunization history on file for this patient.     Active Problems:  Patient Active Problem List   Diagnosis Code    Brain mass G93.89    S/P craniotomy Z98.890    Hydrocephalus (HCC) G91.9    Altered mental state R41.82    Hypokalemia E87.6    GERD (gastroesophageal reflux disease) K21.9    HTN (hypertension) I10    Astrocytoma brain tumor (HCC) C71.9       Isolation/Infection:   Isolation          No Isolation        Patient Infection Status     None to display          Nurse Assessment:  Last Vital Signs: BP (!) 155/85   Pulse 82   Temp 99.3 °F (37.4 °C) (Oral)   Resp 16   Ht 5' 2\" (1.575 m)   Wt 228 lb 3.2 oz (103.5 kg)   SpO2 98%   BMI 41.74 kg/m²     Last documented pain score (0-10 scale): Pain Level: 0  Last Weight:   Wt Readings from Last 1 Encounters:   20 228 lb 3.2 oz (103.5 kg)     Mental Status:  {IP PT MENTAL STATUS:}    IV Access:  { SONNY IV ACCESS:580551708}    Nursing Mobility/ADLs:  Walking   {P DME ZMQU:225511386}  Transfer  {P DME FNRQ:761805067}  Bathing  {[facility] DME AQIU:611774405}  Dressing  {CHP DME BWCD:445743380}  Toileting  {CHP DME CBHN:210129308}  Feeding  {[facility] DME HAFQ:385724746}  Med Admin  {[facility] DME JNSA:122885020}  Med Delivery   { SONNY MED Delivery:048491897}    Wound Care Documentation and Therapy:        Elimination:  Continence:   · Bowel: {YES / YZ:09249}  · Bladder: {YES / HK:36250}  Urinary Catheter: {Urinary Catheter:530433002}   Colostomy/Ileostomy/Ileal Conduit: {YES / BY:10206}       Date of Last BM: ***  No intake or output data in the 24 hours ending 20 0918  I/O last 3 completed shifts:   In: 180.4 [I.V.:180.4]  Out: 1200 [Urine:1200]    Safety Concerns:     [address] Safety Concerns:706078614}    Impairments/Disabilities:      [address] Impairments/Disabilities:481272829}    Nutrition Therapy:  Current Nutrition Therapy:   [address] Diet List:890096373}    Routes of Feeding: {[facility] DME Other Feedings:940397418}  Liquids: {Slp liquid thickness:29186}  Daily Fluid Restriction: {CHP DME Yes amt example:600966221}  Last Modified Barium Swallow with Video (Video Swallowing Test): {Done Not Done EBBV:015749079}    Treatments at the Time of Hospital Discharge:   Respiratory Treatments: ***  Oxygen Therapy:  {Therapy; copd oxygen:26566}  Ventilator:    { CC Vent DRIV:424981763}    Rehab Therapies: PT,OT  Weight Bearing Status/Restrictions: { CC Weight Bearin}  Other Medical Equipment (for information only, NOT a DME order):  {EQUIPMENT:167066759}  Other Treatments: HOME HEALTH CARE: LEVEL 3 SAFETY        -Initial home health evaluation to occur within 24-48 hours, in patient home    -Home health agency to establish plan of care for patient over 60 day period    -Medication Reconciliation    -PT/OT/Speech evaluations in home within 24-48 hours of discharge; including  -DME and home safety    -Frontload therapy 5 days, then 3x a week    -OT to evaluate if patient has [address] needs for personal care    - evaluation within 24-48 hours, includes evaluation of resources   and insurance to determine [place], [place], LTC, and Medicaid options    -PCP Visit scheduled within three to seven days of discharge       Patient's personal belongings (please select all that are sent with patient):  {CHP DME Belongings:852705982}    RN SIGNATURE:  {Esignature:693104517}    CASE MANAGEMENT/SOCIAL WORK SECTION    Inpatient Status Date: ***    Readmission Risk Assessment Score:  Readmission Risk              Risk of Unplanned Readmission:        0           Discharging to Facility/ Agency   Name: [facility] will call for Appointment  Phone: [phone]  Fax: [phone]    Dialysis Facility (if applicable)   · Name:  · Address:  · Dialysis Schedule:  · Phone:  · Fax:    / signature: {Esignature:168807932}    PHYSICIAN SECTION    Prognosis: fair    Condition at Discharge: stable    Rehab Potential (if transferring to Rehab): fair    Recommended Labs or Other Treatments After Discharge: ***    Physician Certification: I certify the above information and transfer of Mitchel Kaiser  is necessary for the continuing treatment of the diagnosis listed and that she requires home care for  30 days.      Update Admission H&P: updated    PHYSICIAN SIGNATURE:  Ene Green 06/27/20

## 2020-06-29 NOTE — PROGRESS NOTES
[address] [facility] home care referral. Spoke with pt's spouse and re: home care plan of care/services. Agreeable. Demographic's verified. Will follow for home care.

## 2020-07-02 ENCOUNTER — TELEPHONE (OUTPATIENT)
Dept: CARDIOLOGY CLINIC | Age: 55
End: 2020-07-02

## 2020-07-02 NOTE — TELEPHONE ENCOUNTER
Received referral from [address] [place] [address] [address] for event monitor. Scanned into chart. LVM for patient to call to make appointment.

## 2020-07-07 NOTE — TELEPHONE ENCOUNTER
Patient's  returned phone call. Patient would like the monitor mailed to her home address on file. Please order  14 day zio patch to be mailed to patient. Instructions on how to return the monitor were given to patient as well.

## 2020-07-08 ENCOUNTER — NURSE ONLY (OUTPATIENT)
Dept: CARDIOLOGY CLINIC | Age: 55
End: 2020-07-08

## 2020-07-08 NOTE — PROGRESS NOTES
Successfully registered patient for a 14 day zio, per Dr Shital Crespo from [address] [place] [address] for syncope. To be mailed to patient.

## 2020-07-20 NOTE — PROGRESS NOTES
Physician Progress Note      Balbir Johnson  CSN #:                  [id number]  :                       1965  ADMIT DATE:       2020 2:17 PM  [address] [place] DATE:        2020 2:23 PM  RESPONDING  PROVIDER #:        Madan Baron MD          QUERY TEXT:    Pt admitted with hydrocephalus. Pt noted to have on CT the following,   degenerative edema surrounding the lesion which is mildly increased when   compared to the most recent brain MRI. Per MRI of brain on  it is noted, lesion crosses the midline with mass   effect on the atria of both lateral ventricles, mild surrounding vasogenic   edema is appreciated, and an increase in the vasogenic edema is noted. If clinically significant, please document in progress notes and discharge   summary if you are evaluating/treating any of the following: The medical record reflects the following:    Risk Factors: Astrocytoma brain tumor  Clinical Indicators: Patient presented with altered mental status, AEB   worsening mentation. Also, with nausea, vomiting, and headache. Treatment: Decadron, imaging. oncology consult. Thank you. Melly Christensen RN CDS  Options provided:  -- Cerebral edema  -- Brain compression  -- Cerebral edema and Brain compression  -- Other - I will add my own diagnosis  -- Dismiss - Clinically unable to determine / Unknown  -- Refer to Clinical Documentation Reviewer    PROVIDER RESPONSE TEXT:    This patient has cerebral edema. Query created by:  Daniel Becerra on 2020 8:29 AM      Electronically signed by:  Madan Baron MD 2020 12:43 PM

## 2020-07-26 ENCOUNTER — APPOINTMENT (OUTPATIENT)
Dept: CT IMAGING | Age: 55
End: 2020-07-26
Payer: COMMERCIAL

## 2020-07-26 ENCOUNTER — HOSPITAL ENCOUNTER (EMERGENCY)
Age: 55
Discharge: HOME | End: 2020-07-27
Attending: EMERGENCY MEDICINE
Payer: COMMERCIAL

## 2020-07-26 LAB
A/G RATIO: 1.4 (ref 1.1–2.2)
ALBUMIN SERPL-MCNC: 3.8 G/DL (ref 3.4–5)
ALP BLD-CCNC: 71 U/L (ref 40–129)
ALT SERPL-CCNC: 32 U/L (ref 10–40)
ANION GAP SERPL CALCULATED.3IONS-SCNC: 15 MMOL/L (ref 3–16)
AST SERPL-CCNC: 17 U/L (ref 15–37)
BASOPHILS ABSOLUTE: 0 K/UL (ref 0–0.2)
BASOPHILS RELATIVE PERCENT: 0.4 %
BILIRUB SERPL-MCNC: 0.5 MG/DL (ref 0–1)
BILIRUBIN URINE: NEGATIVE
BLOOD, URINE: NEGATIVE
BUN BLDV-MCNC: 29 MG/DL (ref 7–20)
CALCIUM SERPL-MCNC: 10 MG/DL (ref 8.3–10.6)
CHLORIDE BLD-SCNC: 102 MMOL/L (ref 99–110)
CLARITY: CLEAR
CO2: 29 MMOL/L (ref 21–32)
COLOR: YELLOW
CREAT SERPL-MCNC: 1.1 MG/DL (ref 0.6–1.1)
EOSINOPHILS ABSOLUTE: 0.1 K/UL (ref 0–0.6)
EOSINOPHILS RELATIVE PERCENT: 0.7 %
GFR AFRICAN AMERICAN: >60
GFR NON-AFRICAN AMERICAN: 52
GLOBULIN: 2.7 G/DL
GLUCOSE BLD-MCNC: 146 MG/DL (ref 70–99)
GLUCOSE URINE: NEGATIVE MG/DL
HCT VFR BLD CALC: 39.6 % (ref 36–48)
HEMOGLOBIN: 13.5 G/DL (ref 12–16)
KETONES, URINE: NEGATIVE MG/DL
LACTIC ACID: 1.8 MMOL/L (ref 0.4–2)
LEUKOCYTE ESTERASE, URINE: NEGATIVE
LYMPHOCYTES ABSOLUTE: 0.3 K/UL (ref 1–5.1)
LYMPHOCYTES RELATIVE PERCENT: 4.1 %
MAGNESIUM: 1.8 MG/DL (ref 1.8–2.4)
MCH RBC QN AUTO: 38.1 PG (ref 26–34)
MCHC RBC AUTO-ENTMCNC: 34.1 G/DL (ref 31–36)
MCV RBC AUTO: 111.5 FL (ref 80–100)
MICROSCOPIC EXAMINATION: NORMAL
MONOCYTES ABSOLUTE: 0.7 K/UL (ref 0–1.3)
MONOCYTES RELATIVE PERCENT: 8.9 %
NEUTROPHILS ABSOLUTE: 6.9 K/UL (ref 1.7–7.7)
NEUTROPHILS RELATIVE PERCENT: 85.9 %
NITRITE, URINE: NEGATIVE
PDW BLD-RTO: 14.9 % (ref 12.4–15.4)
PH UA: 6.5 (ref 5–8)
PLATELET # BLD: 92 K/UL (ref 135–450)
PLATELET SLIDE REVIEW: ADEQUATE
PMV BLD AUTO: 7.3 FL (ref 5–10.5)
POTASSIUM REFLEX MAGNESIUM: 3.5 MMOL/L (ref 3.5–5.1)
PROCALCITONIN: 0.29 NG/ML (ref 0–0.15)
PROTEIN UA: NEGATIVE MG/DL
RBC # BLD: 3.55 M/UL (ref 4–5.2)
SLIDE REVIEW: ABNORMAL
SODIUM BLD-SCNC: 146 MMOL/L (ref 136–145)
SPECIFIC GRAVITY UA: <1.005 (ref 1–1.03)
TOTAL PROTEIN: 6.5 G/DL (ref 6.4–8.2)
URINE REFLEX TO CULTURE: NORMAL
URINE TYPE: NORMAL
UROBILINOGEN, URINE: 0.2 E.U./DL
WBC # BLD: 8 K/UL (ref 4–11)

## 2020-07-26 PROCEDURE — 99284 EMERGENCY DEPT VISIT MOD MDM: CPT

## 2020-07-26 PROCEDURE — 70450 CT HEAD/BRAIN W/O DYE: CPT

## 2020-07-26 PROCEDURE — 80053 COMPREHEN METABOLIC PANEL: CPT

## 2020-07-26 PROCEDURE — 36415 COLL VENOUS BLD VENIPUNCTURE: CPT

## 2020-07-26 PROCEDURE — 84145 PROCALCITONIN (PCT): CPT

## 2020-07-26 PROCEDURE — 6360000004 HC RX CONTRAST MEDICATION: Performed by: EMERGENCY MEDICINE

## 2020-07-26 PROCEDURE — 85025 COMPLETE CBC W/AUTO DIFF WBC: CPT

## 2020-07-26 PROCEDURE — 83605 ASSAY OF LACTIC ACID: CPT

## 2020-07-26 PROCEDURE — 6360000002 HC RX W HCPCS: Performed by: EMERGENCY MEDICINE

## 2020-07-26 PROCEDURE — 96375 TX/PRO/DX INJ NEW DRUG ADDON: CPT

## 2020-07-26 PROCEDURE — 81003 URINALYSIS AUTO W/O SCOPE: CPT

## 2020-07-26 PROCEDURE — 96374 THER/PROPH/DIAG INJ IV PUSH: CPT

## 2020-07-26 PROCEDURE — 83735 ASSAY OF MAGNESIUM: CPT

## 2020-07-26 PROCEDURE — 74177 CT ABD & PELVIS W/CONTRAST: CPT

## 2020-07-26 RX ORDER — GABAPENTIN 400 MG/1
500 CAPSULE ORAL 3 TIMES DAILY
COMMUNITY
End: 2021-07-27

## 2020-07-26 RX ORDER — CEPHALEXIN 500 MG/1
500 CAPSULE ORAL 4 TIMES DAILY
COMMUNITY
End: 2020-09-02 | Stop reason: ALTCHOICE

## 2020-07-26 RX ORDER — MORPHINE SULFATE 4 MG/ML
4 INJECTION, SOLUTION INTRAMUSCULAR; INTRAVENOUS ONCE
Status: COMPLETED | OUTPATIENT
Start: 2020-07-26 | End: 2020-07-26

## 2020-07-26 RX ORDER — ONDANSETRON 2 MG/ML
4 INJECTION INTRAMUSCULAR; INTRAVENOUS ONCE
Status: COMPLETED | OUTPATIENT
Start: 2020-07-26 | End: 2020-07-26

## 2020-07-26 RX ADMIN — IOPAMIDOL 75 ML: 755 INJECTION, SOLUTION INTRAVENOUS at 23:42

## 2020-07-26 RX ADMIN — ONDANSETRON 4 MG: 2 INJECTION INTRAMUSCULAR; INTRAVENOUS at 22:22

## 2020-07-26 RX ADMIN — MORPHINE SULFATE 4 MG: 4 INJECTION INTRAVENOUS at 22:22

## 2020-07-26 ASSESSMENT — PAIN SCALES - WONG BAKER: WONGBAKER_NUMERICALRESPONSE: 4

## 2020-07-26 ASSESSMENT — PAIN SCALES - GENERAL: PAINLEVEL_OUTOF10: 1

## 2020-07-27 VITALS
HEART RATE: 107 BPM | WEIGHT: 230 LBS | DIASTOLIC BLOOD PRESSURE: 65 MMHG | BODY MASS INDEX: 42.33 KG/M2 | TEMPERATURE: 97.6 F | SYSTOLIC BLOOD PRESSURE: 151 MMHG | OXYGEN SATURATION: 95 % | HEIGHT: 62 IN | RESPIRATION RATE: 17 BRPM

## 2020-07-27 PROCEDURE — 6370000000 HC RX 637 (ALT 250 FOR IP): Performed by: EMERGENCY MEDICINE

## 2020-07-27 RX ORDER — AMOXICILLIN AND CLAVULANATE POTASSIUM 875; 125 MG/1; MG/1
1 TABLET, FILM COATED ORAL ONCE
Status: COMPLETED | OUTPATIENT
Start: 2020-07-27 | End: 2020-07-27

## 2020-07-27 RX ORDER — AMOXICILLIN AND CLAVULANATE POTASSIUM 875; 125 MG/1; MG/1
1 TABLET, FILM COATED ORAL 2 TIMES DAILY
Qty: 20 TABLET | Refills: 0 | Status: SHIPPED | OUTPATIENT
Start: 2020-07-27 | End: 2020-08-06

## 2020-07-27 RX ORDER — MECLIZINE HCL 12.5 MG/1
25 TABLET ORAL ONCE
Status: DISCONTINUED | OUTPATIENT
Start: 2020-07-27 | End: 2020-07-27

## 2020-07-27 RX ADMIN — AMOXICILLIN AND CLAVULANATE POTASSIUM 1 TABLET: 875; 125 TABLET, FILM COATED ORAL at 00:56

## 2020-07-27 NOTE — ED PROVIDER NOTES
CHIEF COMPLAINT  Headache (headache, fever, nausea for over a week. pts  states she has an infection to lower stomach as well that pcp has been seeing but it is getting worse ) and Other      HISTORY OF PRESENT ILLNESS  Karo Briones is a 48 y.o. female who presents to the ED with headache subjective fever and nausea as well as right lower abdomen abscess. Stated onset of symptoms was 1 week. The location is right lower abdomen. The patient describes the symptoms as having purulent drainage with some pain in the right lower abdomen she has been on Keflex for almost 1 week and redness has been increasing. Also noticed drainage yesterday. . As far as alleviating or aggravating factors the patient nothing makes his symptoms better or worse she is only been on the Keflex for the symptoms. Currently denying any nausea or headache or vision changes or any numbness or tingling or weakness or LOC. .   No other complaints, modifying factors or associated symptoms. I have reviewed the following from the nursing documentation. 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 Chan Gallegos MD at [facility] CYST EXCISION       History reviewed. No pertinent family history.   Social History     Socioeconomic History    Marital status:      Spouse name: Not on file    Number of children: Not on file    Years of education: Not on file    Highest education level: Not on file   Occupational History    Not on file   Social Needs    Financial resource strain: Not on file    Food insecurity     Worry: Not on file     Inability: Not on file  Transportation needs     Medical: Not on file     Non-medical: Not on file   Tobacco Use    Smoking status: Never Smoker    Smokeless tobacco: Never Used   Substance and Sexual Activity    Alcohol use: Not Currently     Comment: rarely    Drug use: Never    Sexual activity: Not on file   Lifestyle    Physical activity     Days per week: Not on file     Minutes per session: Not on file    Stress: Not on file   Relationships    Social connections     Talks on phone: Not on file     Gets together: Not on file     Attends Jain service: Not on file     Active member of club or organization: Not on file     Attends meetings of clubs or organizations: Not on file     Relationship status: Not on file    Intimate partner violence     Fear of current or ex partner: Not on file     Emotionally abused: Not on file     Physically abused: Not on file     Forced sexual activity: Not on file   Other Topics Concern    Not on file   Social History Narrative    Not on file     Current Facility-Administered Medications   Medication Dose Route Frequency Provider Last Rate Last Dose    morphine injection 4 mg  4 mg Intravenous Once Gera Britain V, DO        ondansetron [facility]) injection 4 mg  4 mg Intravenous Once Clearence Pae, DO         Current Outpatient Medications   Medication Sig Dispense Refill    gabapentin (NEURONTIN) 400 MG capsule Take 500 mg by mouth 3 times daily.       cephALEXin (KEFLEX) 500 MG capsule Take 500 mg by mouth 4 times daily      potassium chloride (KLOR-CON M) 20 MEQ extended release tablet Take 1 tablet by mouth daily 30 tablet 0    dexamethasone (DECADRON) 4 MG tablet Take 1 tablet by mouth 2 times daily (with meals) 60 tablet 0    docusate sodium (COLACE) 100 MG capsule Take 100 mg by mouth daily      venlafaxine (EFFEXOR XR) 75 MG extended release capsule Take 75 mg by mouth daily      pantoprazole (PROTONIX) 40 MG tablet Take 1 tablet by mouth every morning (before breakfast) 30 tablet 3    benazepril-hydrochlorthiazide (LOTENSIN HCT) 20-25 MG per tablet Take 1 tablet by mouth daily       levETIRAcetam (KEPPRA) 500 MG tablet Take 1,000 mg by mouth 2 times daily        No Known Allergies    REVIEW OF SYSTEMS  10 systems reviewed, pertinent positives per HPI otherwise noted to be negative. PHYSICAL EXAM  /84   Pulse 107   Temp 97.6 °F (36.4 °C) (Oral)   Resp 17   Ht 5' 2\" (1.575 m)   Wt 230 lb (104.3 kg)   SpO2 98%   BMI 42.07 kg/m²   GENERAL APPEARANCE: Awake and alert. Cooperative. No acute distress. HEAD: Normocephalic. Atraumatic. EYES: PERRL. EOM's grossly intact. ENT: Mucous membranes are moist.   NECK: Supple. HEART: RRR. CHEST/LUNGS: Chest atraumatic, nontender, respirations unlabored. CTAB. Good air exchange. Speaking comfortably in full sentences. BACK: No midline spinal tenderness or step-off. ABDOMEN: Soft. right lower quadrant and R groin area erythema and fluctuance with induration with noted purulent drainage of an opening approximately half a centimeter in the superior right pelvic region. No bleeding. Mild tenderness. . Normal bowel sounds. EXTREMITIES: No peripheral edema. Moves all extremities equally. All extremities neurovascularly intact. SKIN: Warm and dry. No acute rashes. NEUROLOGICAL: Alert and oriented. CN 2-12 intact, No gross facial drooping. Strength 5/5, sensation intact. Normal coordination. PSYCHIATRIC: Normal mood and affect. LABS  I have reviewed all labs for this visit.    Results for orders placed or performed during the hospital encounter of 07/26/20   CBC Auto Differential   Result Value Ref Range    WBC 8.0 4.0 - 11.0 K/uL    RBC 3.55 (L) 4.00 - 5.20 M/uL    Hemoglobin 13.5 12.0 - 16.0 g/dL    Hematocrit 39.6 36.0 - 48.0 %    .5 (H) 80.0 - 100.0 fL    MCH 38.1 (H) 26.0 - 34.0 pg    MCHC 34.1 31.0 - 36.0 g/dL    RDW 14.9 12.4 - 15.4 %    Platelets 92 (L) 473 - 450 K/uL    MPV 7.3 5.0 - 10.5 fL PLATELET SLIDE REVIEW Adequate     SLIDE REVIEW see below     Neutrophils % 85.9 %    Lymphocytes % 4.1 %    Monocytes % 8.9 %    Eosinophils % 0.7 %    Basophils % 0.4 %    Neutrophils Absolute 6.9 1.7 - 7.7 K/uL    Lymphocytes Absolute 0.3 (L) 1.0 - 5.1 K/uL    Monocytes Absolute 0.7 0.0 - 1.3 K/uL    Eosinophils Absolute 0.1 0.0 - 0.6 K/uL    Basophils Absolute 0.0 0.0 - 0.2 K/uL   Comprehensive Metabolic Panel w/ Reflex to MG   Result Value Ref Range    Sodium 146 (H) 136 - 145 mmol/L    Potassium reflex Magnesium 3.5 3.5 - 5.1 mmol/L    Chloride 102 99 - 110 mmol/L    CO2 29 21 - 32 mmol/L    Anion Gap 15 3 - 16    Glucose 146 (H) 70 - 99 mg/dL    BUN 29 (H) 7 - 20 mg/dL    CREATININE 1.1 0.6 - 1.1 mg/dL    GFR Non-African American 52 (A) >60    GFR African American >60 >60    Calcium 10.0 8.3 - 10.6 mg/dL    Total Protein 6.5 6.4 - 8.2 g/dL    Alb 3.8 3.4 - 5.0 g/dL    Albumin/Globulin Ratio 1.4 1.1 - 2.2    Total Bilirubin 0.5 0.0 - 1.0 mg/dL    Alkaline Phosphatase 71 40 - 129 U/L    ALT 32 10 - 40 U/L    AST 17 15 - 37 U/L    Globulin 2.7 g/dL   Lactic Acid, Plasma   Result Value Ref Range    Lactic Acid 1.8 0.4 - 2.0 mmol/L   Procalcitonin   Result Value Ref Range    Procalcitonin 0.29 (H) 0.00 - 0.15 ng/mL   Urinalysis Reflex to Culture    Specimen: Urine, clean catch   Result Value Ref Range    Color, UA YELLOW Straw/Yellow    Clarity, UA Clear Clear    Glucose, Ur Negative Negative mg/dL    Bilirubin Urine Negative Negative    Ketones, Urine Negative Negative mg/dL    Specific Gravity, UA <1.005 1.005 - 1.030    Blood, Urine Negative Negative    pH, UA 6.5 5.0 - 8.0    Protein, UA Negative Negative mg/dL    Urobilinogen, Urine 0.2 <2.0 E.U./dL    Nitrite, Urine Negative Negative    Leukocyte Esterase, Urine Negative Negative    Microscopic Examination Not Indicated     Urine Type Voided     Urine Reflex to Culture Not Indicated    Magnesium   Result Value Ref Range    Magnesium 1.80 1.80 - 2.40 mg/dL RADIOLOGY  X-RAYS:  I have reviewed radiologic plain film image(s). ALL OTHER NON-PLAIN FILM IMAGES SUCH AS CT, ULTRASOUND AND MRI HAVE BEEN READ BY THE RADIOLOGIST. CT ABDOMEN PELVIS W IV CONTRAST Additional Contrast? None   Preliminary Result   1. Focal soft tissue swelling within the right inguinal region/groin with   small focus of gas. Findings can be correlated with any recent procedure an   area of infection. No obvious fluid collection or abscess. 2. Very mild intrahepatic biliary duct dilation with can be correlated with   LFTs. 3. Possible cholelithiasis. 4. No other acute abdominal or pelvic abnormality. CT Head WO Contrast   Final Result   Redemonstration of multiple masses in the bilateral cerebral hemispheres with   dominant mass centered in the bilateral parietal regions and splenium of the   corpus callosum. Overall, the previously noted vasogenic edema associated   with these masses has improved from CT head done June 24, 2020. No acute intracranial hemorrhage or midline shift. ED COURSE/MDM  Patient seen and evaluated. Patient presents with draining lower abdominal/groin abscess with what appears to be overlying cellulitis and likely fungal infection as well. Per the patient and the  erythema is been increasing as well as the pain. She has been on Keflex for almost 6 days. We will get work-up here including a lactic acid procalcitonin will also get a CT of the abdomen and pelvis with IV contrast.  We also give her something for pain for morphine as well as some Zofran for nausea. We will also get a CT of her head she had an MRI on admission here at the end of June where she had increasing hydrocephalus that she has a history of a brain tumor. She is denying any headache at this time.   No neuro deficits on exam.  I have a low suspicion of Luis Enrique's gangrene at this time although there would be a concern that he could progress to this if not treated appropriately. **CT scan shows no discrete abscess this is likely because much of it has drained already, there is no deeper abscess or concern on my part for Luis Enrique's at this time but I do think pt should be SWITCHED from Keflex to Augmentin, she has been on the Keflex about 5-6 days now. Otherwise lab work not concerning and CT improved from previous. All diagnostic tests reviewed and results discussed with patient. Plan of care discussed with patient. Patient in agreement with plan. Discharge Medication List as of 7/27/2020 12:53 AM      START taking these medications    Details   amoxicillin-clavulanate (AUGMENTIN) 875-125 MG per tablet Take 1 tablet by mouth 2 times daily for 10 days, Disp-20 tablet,R-0Print             CLINICAL IMPRESSION  1. Abscess of groin, right        Blood pressure 123/84, pulse 107, temperature 97.6 °F (36.4 °C), temperature source Oral, resp. rate 17, height 5' 2\" (1.575 m), weight 230 lb (104.3 kg), SpO2 98 %. DISPOSITION  Leighann Bhandari was discharged to home in stable condition. This chart was generated in part by using Dragon Dictation system and may contain errors related to that system including errors in grammar, punctuation, and spelling, as well as words and phrases that may be inappropriate. When dictating, effort is made to correct spelling/grammar errors. If there are any questions or concerns please feel free to contact the dictating provider for clarification.      Carlos Wilkinson, DO  ATTENDING, [id number] [place] [address],   07/27/20 6395

## 2020-08-13 ENCOUNTER — OFFICE VISIT (OUTPATIENT)
Dept: SURGERY | Age: 55
End: 2020-08-13
Payer: COMMERCIAL

## 2020-08-13 VITALS — TEMPERATURE: 97 F | BODY MASS INDEX: 40.24 KG/M2 | WEIGHT: 220 LBS

## 2020-08-13 PROCEDURE — 99244 OFF/OP CNSLTJ NEW/EST MOD 40: CPT | Performed by: SURGERY

## 2020-08-13 ASSESSMENT — ENCOUNTER SYMPTOMS
ABDOMINAL PAIN: 1
COLOR CHANGE: 1

## 2020-08-13 NOTE — PROGRESS NOTES
:     Chapincito Camargo is a 48 y.o. female     CC: Abscess of the right groin, epigastric pain    HPI: 69-year-old female with an abscess of the right groin. The abscess has been treated with Keflex, Augmentin and Bactrim. She reportedly had some spontaneous drainage. A CAT scan from the emergency room on 7/26/2020 showed inflammatory changes of the subcutaneous tissue with no drainable fluid collection. She has been having recent episodes of epigastric abdominal pain which are self-limited. No history of nausea, vomiting, anorexia, fevers, chills, change in bowel habits or urinary symptoms. Past Medical History:   Diagnosis Date    Brain tumor [facility])     recent memory loss, confusion, persistant headache, nausea, dizzy    History of chemotherapy     History of radiation therapy     Hypertension        Patient has no known allergies. Past Surgical History:   Procedure Laterality Date    CRANIOTOMY Right 2/26/2020    RIGHT PARIETAL CRANIOTOMY FOR STEREOTACTIC BIOPSY OF BRAIN MASS performed by Ramon Villeda. [facility]urcio Brown [facility] [address] [facility]          Prior to Visit Medications    Medication Sig Taking? Authorizing Provider   gabapentin (NEURONTIN) 400 MG capsule Take 500 mg by mouth 3 times daily.  Yes Historical Provider, MD   cephALEXin (KEFLEX) 500 MG capsule Take 500 mg by mouth 4 times daily Yes Historical Provider, MD   dexamethasone (DECADRON) 4 MG tablet Take 1 tablet by mouth 2 times daily (with meals) Yes Chan Harper MD   docusate sodium (COLACE) 100 MG capsule Take 100 mg by mouth daily Yes Historical Provider, MD   venlafaxine (EFFEXOR XR) 75 MG extended release capsule Take 75 mg by mouth daily Yes Historical Provider, MD   pantoprazole (PROTONIX) 40 MG tablet Take 1 tablet by mouth every morning (before breakfast) Yes JEN Zapata - CNP   benazepril-hydrochlorthiazide (LOTENSIN HCT) 20-25 MG per tablet Take 1 tablet by mouth daily  Yes Historical Provider, MD   levETIRAcetam (KEPPRA) 500 MG tablet Take 1,000 mg by mouth 2 times daily  Yes Historical Provider, MD   potassium chloride (KLOR-CON M) 20 MEQ extended release tablet Take 1 tablet by mouth daily  Erlin Holland MD       Social History     Socioeconomic History    Marital status:      Spouse name: Not on file    Number of children: Not on file    Years of education: Not on file    Highest education level: Not on file   Occupational History    Not on file   Social Needs    Financial resource strain: Not on file    Food insecurity     Worry: Not on file     Inability: Not on file    Transportation needs     Medical: Not on file     Non-medical: Not on file   Tobacco Use    Smoking status: Never Smoker    Smokeless tobacco: Never Used   Substance and Sexual Activity    Alcohol use: Not Currently     Comment: rarely    Drug use: Never    Sexual activity: Not on file   Lifestyle    Physical activity     Days per week: Not on file     Minutes per session: Not on file    Stress: Not on file   Relationships    Social connections     Talks on phone: Not on file     Gets together: Not on file     Attends Cheondoism service: Not on file     Active member of club or organization: Not on file     Attends meetings of clubs or organizations: Not on file     Relationship status: Not on file    Intimate partner violence     Fear of current or ex partner: Not on file     Emotionally abused: Not on file     Physically abused: Not on file     Forced sexual activity: Not on file   Other Topics Concern    Not on file   Social History Narrative    Not on file       Review of Systems   Constitutional: Positive for activity change and appetite change. Gastrointestinal: Positive for abdominal pain. Skin: Positive for color change, rash and wound. Allergic/Immunologic: Positive for immunocompromised state. Neurological: Positive for dizziness, seizures and weakness. Psychiatric/Behavioral: Positive for confusion, decreased concentration and sleep disturbance.       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 The abscess spontaneously drained and has been treated with multiple courses of oral antibiotics. At this point time, the infection has resolved and there is only minimal residual induration. Her second complaint is of intermittent episodes of epigastric abdominal pain. The episodes have no associated symptoms and are self-limited. CAT scan of the abdomen and pelvis when she was in the emergency room in late July of this year showed possible gallstones. Plan:      No further recommendations regarding the resolving right groin abscess other than to wash with antibacterial soap in the bilateral axilla, groins and perineal regions. A right upper quadrant ultrasound was ordered to further evaluate for gallstones. Will contact the patient and her  by phone with results of the ultrasound.

## 2020-08-13 NOTE — LETTER
[address]  [address] [address]  Phone: [phone]  Fax: [phone]    August 13, 2020    Patient: Darrel Mondragon  MRN:  [id number]  YOB: 1965  Date of Visit: 8/13/2020    Dear Dr Kalie Griffin: Thank you for the request for consultation for Darrel Mondragon. Below are the relevant portions of my assessment and plan of care. 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 The abscess spontaneously drained and has been treated with multiple courses of oral antibiotics. At this point time, the infection has resolved and there is only minimal residual induration. Her second complaint is of intermittent episodes of epigastric abdominal pain. The episodes have no associated symptoms and are self-limited. CAT scan of the abdomen and pelvis when she was in the emergency room in late July of this year showed possible gallstones. Plan:  No further recommendations regarding the resolving right groin abscess other than to wash with antibacterial soap in the bilateral axilla, groins and perineal regions. A right upper quadrant ultrasound was ordered to further evaluate for gallstones. Will contact the patient and her  by phone with results of the ultrasound. If you have questions, please do not hesitate to call me. I look forward to following Kelly Solis along with you.     Sincerely,    Toshia Murrieta MD    CC providers:    Toan Mcarthur MD  [address] [facility]  [phone]: [address] [facility]

## 2020-08-26 ENCOUNTER — HOSPITAL ENCOUNTER (OUTPATIENT)
Dept: ULTRASOUND IMAGING | Age: 55
Discharge: HOME | End: 2020-08-26
Payer: COMMERCIAL

## 2020-08-26 PROCEDURE — 76705 ECHO EXAM OF ABDOMEN: CPT

## 2020-08-26 NOTE — RESULT ENCOUNTER NOTE
Called number on file, which was husbands cell. I did not give result to .  I called patients cell number given to me by  and left message for her to call me back for results

## 2020-09-25 ENCOUNTER — HOSPITAL ENCOUNTER (OUTPATIENT)
Dept: MRI IMAGING | Age: 55
Discharge: HOME | End: 2020-09-25
Payer: COMMERCIAL

## 2020-09-25 PROCEDURE — A9577 INJ MULTIHANCE: HCPCS | Performed by: INTERNAL MEDICINE

## 2020-09-25 PROCEDURE — 70553 MRI BRAIN STEM W/O & W/DYE: CPT

## 2020-09-25 PROCEDURE — 6360000004 HC RX CONTRAST MEDICATION: Performed by: INTERNAL MEDICINE

## 2020-09-25 PROCEDURE — 2580000003 HC RX 258: Performed by: INTERNAL MEDICINE

## 2020-09-25 RX ORDER — SODIUM CHLORIDE 0.9 % (FLUSH) 0.9 %
10 SYRINGE (ML) INJECTION ONCE
Status: COMPLETED | OUTPATIENT
Start: 2020-09-25 | End: 2020-09-25

## 2020-09-25 RX ADMIN — GADOBENATE DIMEGLUMINE 20 ML: 529 INJECTION, SOLUTION INTRAVENOUS at 11:14

## 2020-09-25 RX ADMIN — Medication 10 ML: at 11:14

## 2020-12-07 ENCOUNTER — HOSPITAL ENCOUNTER (OUTPATIENT)
Dept: MRI IMAGING | Age: 55
Discharge: HOME | End: 2020-12-07
Payer: COMMERCIAL

## 2020-12-07 PROCEDURE — 6360000004 HC RX CONTRAST MEDICATION: Performed by: INTERNAL MEDICINE

## 2020-12-07 PROCEDURE — 2580000003 HC RX 258: Performed by: INTERNAL MEDICINE

## 2020-12-07 PROCEDURE — A9577 INJ MULTIHANCE: HCPCS | Performed by: INTERNAL MEDICINE

## 2020-12-07 PROCEDURE — 70553 MRI BRAIN STEM W/O & W/DYE: CPT

## 2020-12-07 RX ORDER — SODIUM CHLORIDE 0.9 % (FLUSH) 0.9 %
10 SYRINGE (ML) INJECTION ONCE
Status: COMPLETED | OUTPATIENT
Start: 2020-12-07 | End: 2020-12-07

## 2020-12-07 RX ADMIN — GADOBENATE DIMEGLUMINE 20 ML: 529 INJECTION, SOLUTION INTRAVENOUS at 12:34

## 2020-12-07 RX ADMIN — Medication 10 ML: at 12:34

## 2021-03-16 ENCOUNTER — NURSE ONLY (OUTPATIENT)
Dept: PRIMARY CARE CLINIC | Age: 56
End: 2021-03-16
Payer: COMMERCIAL

## 2021-03-16 DIAGNOSIS — Z23 HIGH PRIORITY FOR COVID-19 VIRUS VACCINATION: Primary | ICD-10-CM

## 2021-03-16 PROCEDURE — 0001A COVID-19, PFIZER VACCINE 30MCG/0.3ML DOSE: CPT | Performed by: NURSE PRACTITIONER

## 2021-03-16 PROCEDURE — 91300 COVID-19, PFIZER VACCINE 30MCG/0.3ML DOSE: CPT | Performed by: NURSE PRACTITIONER

## 2021-07-27 ENCOUNTER — APPOINTMENT (OUTPATIENT)
Dept: GENERAL RADIOLOGY | Age: 56
DRG: 871 | End: 2021-07-27
Payer: COMMERCIAL

## 2021-07-27 ENCOUNTER — HOSPITAL ENCOUNTER (INPATIENT)
Age: 56
LOS: 1 days | Discharge: HOSPICE | DRG: 871 | End: 2021-07-28
Attending: EMERGENCY MEDICINE | Admitting: INTERNAL MEDICINE
Payer: COMMERCIAL

## 2021-07-27 ENCOUNTER — APPOINTMENT (OUTPATIENT)
Dept: CT IMAGING | Age: 56
DRG: 871 | End: 2021-07-27
Payer: COMMERCIAL

## 2021-07-27 DIAGNOSIS — D49.6 BRAIN NEOPLASM (HCC): ICD-10-CM

## 2021-07-27 DIAGNOSIS — J18.9 PNEUMONIA OF BOTH LUNGS DUE TO INFECTIOUS ORGANISM, UNSPECIFIED PART OF LUNG: ICD-10-CM

## 2021-07-27 DIAGNOSIS — R41.82 ALTERED MENTAL STATUS, UNSPECIFIED ALTERED MENTAL STATUS TYPE: Primary | ICD-10-CM

## 2021-07-27 PROBLEM — G93.41 ACUTE METABOLIC ENCEPHALOPATHY: Status: ACTIVE | Noted: 2021-07-27

## 2021-07-27 LAB
A/G RATIO: 1.1 (ref 1.1–2.2)
ALBUMIN SERPL-MCNC: 3.1 G/DL (ref 3.4–5)
ALP BLD-CCNC: 80 U/L (ref 40–129)
ALT SERPL-CCNC: 59 U/L (ref 10–40)
ANION GAP SERPL CALCULATED.3IONS-SCNC: 12 MMOL/L (ref 3–16)
AST SERPL-CCNC: 47 U/L (ref 15–37)
BACTERIA: ABNORMAL /HPF
BASE EXCESS VENOUS: 4.7 MMOL/L
BASOPHILS ABSOLUTE: 0 K/UL (ref 0–0.2)
BASOPHILS RELATIVE PERCENT: 0.4 %
BILIRUB SERPL-MCNC: 0.7 MG/DL (ref 0–1)
BILIRUBIN URINE: NEGATIVE
BLOOD, URINE: NEGATIVE
BUN BLDV-MCNC: 22 MG/DL (ref 7–20)
CALCIUM SERPL-MCNC: 8.3 MG/DL (ref 8.3–10.6)
CARBOXYHEMOGLOBIN: 1.1 %
CHLORIDE BLD-SCNC: 100 MMOL/L (ref 99–110)
CLARITY: ABNORMAL
CO2: 24 MMOL/L (ref 21–32)
COLOR: YELLOW
COMMENT UA: ABNORMAL
CREAT SERPL-MCNC: 0.5 MG/DL (ref 0.6–1.1)
EKG ATRIAL RATE: 120 BPM
EKG DIAGNOSIS: NORMAL
EKG P AXIS: 45 DEGREES
EKG P-R INTERVAL: 114 MS
EKG Q-T INTERVAL: 288 MS
EKG QRS DURATION: 70 MS
EKG QTC CALCULATION (BAZETT): 407 MS
EKG R AXIS: 33 DEGREES
EKG T AXIS: 27 DEGREES
EKG VENTRICULAR RATE: 120 BPM
EOSINOPHILS ABSOLUTE: 0.1 K/UL (ref 0–0.6)
EOSINOPHILS RELATIVE PERCENT: 1.2 %
EPITHELIAL CELLS, UA: 3 /HPF (ref 0–5)
GFR AFRICAN AMERICAN: >60
GFR NON-AFRICAN AMERICAN: >60
GLOBULIN: 2.7 G/DL
GLUCOSE BLD-MCNC: 119 MG/DL (ref 70–99)
GLUCOSE URINE: NEGATIVE MG/DL
HCO3 VENOUS: 28 MMOL/L (ref 23–29)
HCT VFR BLD CALC: 38.7 % (ref 36–48)
HEMOGLOBIN: 13.4 G/DL (ref 12–16)
HYALINE CASTS: 4 /LPF (ref 0–8)
KETONES, URINE: NEGATIVE MG/DL
LACTIC ACID, SEPSIS: 2 MMOL/L (ref 0.4–1.9)
LACTIC ACID, SEPSIS: 2.2 MMOL/L (ref 0.4–1.9)
LACTIC ACID, SEPSIS: 2.4 MMOL/L (ref 0.4–1.9)
LACTIC ACID, SEPSIS: 2.7 MMOL/L (ref 0.4–1.9)
LEUKOCYTE ESTERASE, URINE: ABNORMAL
LYMPHOCYTES ABSOLUTE: 0.3 K/UL (ref 1–5.1)
LYMPHOCYTES RELATIVE PERCENT: 4.7 %
MAGNESIUM: 1.8 MG/DL (ref 1.8–2.4)
MCH RBC QN AUTO: 34.1 PG (ref 26–34)
MCHC RBC AUTO-ENTMCNC: 34.6 G/DL (ref 31–36)
MCV RBC AUTO: 98.6 FL (ref 80–100)
METHEMOGLOBIN VENOUS: 0.6 %
MICROSCOPIC EXAMINATION: YES
MONOCYTES ABSOLUTE: 0.1 K/UL (ref 0–1.3)
MONOCYTES RELATIVE PERCENT: 1.4 %
NEUTROPHILS ABSOLUTE: 5.2 K/UL (ref 1.7–7.7)
NEUTROPHILS RELATIVE PERCENT: 92.3 %
NITRITE, URINE: POSITIVE
O2 SAT, VEN: 91 %
O2 THERAPY: ABNORMAL
PCO2, VEN: 38 MMHG (ref 40–50)
PDW BLD-RTO: 19.3 % (ref 12.4–15.4)
PH UA: 7 (ref 5–8)
PH VENOUS: 7.48 (ref 7.35–7.45)
PLATELET # BLD: 62 K/UL (ref 135–450)
PMV BLD AUTO: 7.5 FL (ref 5–10.5)
PO2, VEN: 57 MMHG
POTASSIUM REFLEX MAGNESIUM: 3.5 MMOL/L (ref 3.5–5.1)
PROTEIN UA: 30 MG/DL
RBC # BLD: 3.92 M/UL (ref 4–5.2)
RBC UA: ABNORMAL /HPF (ref 0–4)
SARS-COV-2, NAAT: NOT DETECTED
SODIUM BLD-SCNC: 136 MMOL/L (ref 136–145)
SPECIFIC GRAVITY UA: 1.02 (ref 1–1.03)
TCO2 CALC VENOUS: 30 MMOL/L
TOTAL PROTEIN: 5.8 G/DL (ref 6.4–8.2)
TROPONIN: <0.01 NG/ML
URINE REFLEX TO CULTURE: ABNORMAL
URINE TYPE: ABNORMAL
UROBILINOGEN, URINE: 4 E.U./DL
WBC # BLD: 5.6 K/UL (ref 4–11)
WBC UA: 6 /HPF (ref 0–5)

## 2021-07-27 PROCEDURE — 92610 EVALUATE SWALLOWING FUNCTION: CPT

## 2021-07-27 PROCEDURE — 2060000000 HC ICU INTERMEDIATE R&B

## 2021-07-27 PROCEDURE — 99285 EMERGENCY DEPT VISIT HI MDM: CPT

## 2021-07-27 PROCEDURE — U0005 INFEC AGEN DETEC AMPLI PROBE: HCPCS

## 2021-07-27 PROCEDURE — 6360000002 HC RX W HCPCS: Performed by: INTERNAL MEDICINE

## 2021-07-27 PROCEDURE — 85025 COMPLETE CBC W/AUTO DIFF WBC: CPT

## 2021-07-27 PROCEDURE — 83605 ASSAY OF LACTIC ACID: CPT

## 2021-07-27 PROCEDURE — 96365 THER/PROPH/DIAG IV INF INIT: CPT

## 2021-07-27 PROCEDURE — 93010 ELECTROCARDIOGRAM REPORT: CPT | Performed by: INTERNAL MEDICINE

## 2021-07-27 PROCEDURE — 82803 BLOOD GASES ANY COMBINATION: CPT

## 2021-07-27 PROCEDURE — 2580000003 HC RX 258: Performed by: NURSE PRACTITIONER

## 2021-07-27 PROCEDURE — 96367 TX/PROPH/DG ADDL SEQ IV INF: CPT

## 2021-07-27 PROCEDURE — 94760 N-INVAS EAR/PLS OXIMETRY 1: CPT

## 2021-07-27 PROCEDURE — 6370000000 HC RX 637 (ALT 250 FOR IP): Performed by: EMERGENCY MEDICINE

## 2021-07-27 PROCEDURE — 80053 COMPREHEN METABOLIC PANEL: CPT

## 2021-07-27 PROCEDURE — 2700000000 HC OXYGEN THERAPY PER DAY

## 2021-07-27 PROCEDURE — 36415 COLL VENOUS BLD VENIPUNCTURE: CPT

## 2021-07-27 PROCEDURE — U0003 INFECTIOUS AGENT DETECTION BY NUCLEIC ACID (DNA OR RNA); SEVERE ACUTE RESPIRATORY SYNDROME CORONAVIRUS 2 (SARS-COV-2) (CORONAVIRUS DISEASE [COVID-19]), AMPLIFIED PROBE TECHNIQUE, MAKING USE OF HIGH THROUGHPUT TECHNOLOGIES AS DESCRIBED BY CMS-2020-01-R: HCPCS

## 2021-07-27 PROCEDURE — 70450 CT HEAD/BRAIN W/O DYE: CPT

## 2021-07-27 PROCEDURE — 2580000003 HC RX 258: Performed by: INTERNAL MEDICINE

## 2021-07-27 PROCEDURE — 93005 ELECTROCARDIOGRAM TRACING: CPT | Performed by: EMERGENCY MEDICINE

## 2021-07-27 PROCEDURE — 6360000002 HC RX W HCPCS: Performed by: EMERGENCY MEDICINE

## 2021-07-27 PROCEDURE — 2580000003 HC RX 258: Performed by: EMERGENCY MEDICINE

## 2021-07-27 PROCEDURE — 96375 TX/PRO/DX INJ NEW DRUG ADDON: CPT

## 2021-07-27 PROCEDURE — 87641 MR-STAPH DNA AMP PROBE: CPT

## 2021-07-27 PROCEDURE — 6360000002 HC RX W HCPCS: Performed by: NURSE PRACTITIONER

## 2021-07-27 PROCEDURE — 81001 URINALYSIS AUTO W/SCOPE: CPT

## 2021-07-27 PROCEDURE — 87040 BLOOD CULTURE FOR BACTERIA: CPT

## 2021-07-27 PROCEDURE — 87635 SARS-COV-2 COVID-19 AMP PRB: CPT

## 2021-07-27 PROCEDURE — 83735 ASSAY OF MAGNESIUM: CPT

## 2021-07-27 PROCEDURE — 84484 ASSAY OF TROPONIN QUANT: CPT

## 2021-07-27 PROCEDURE — 71045 X-RAY EXAM CHEST 1 VIEW: CPT

## 2021-07-27 PROCEDURE — C9254 INJECTION, LACOSAMIDE: HCPCS | Performed by: NURSE PRACTITIONER

## 2021-07-27 RX ORDER — SODIUM CHLORIDE 9 MG/ML
25 INJECTION, SOLUTION INTRAVENOUS PRN
Status: DISCONTINUED | OUTPATIENT
Start: 2021-07-27 | End: 2021-07-28 | Stop reason: HOSPADM

## 2021-07-27 RX ORDER — LEVETIRACETAM 10 MG/ML
1000 INJECTION INTRAVASCULAR ONCE
Status: COMPLETED | OUTPATIENT
Start: 2021-07-27 | End: 2021-07-27

## 2021-07-27 RX ORDER — LEVETIRACETAM 10 MG/ML
1000 INJECTION INTRAVASCULAR EVERY 12 HOURS
Status: DISCONTINUED | OUTPATIENT
Start: 2021-07-27 | End: 2021-07-28 | Stop reason: HOSPADM

## 2021-07-27 RX ORDER — ACETAMINOPHEN 650 MG/1
650 SUPPOSITORY RECTAL EVERY 6 HOURS PRN
Status: DISCONTINUED | OUTPATIENT
Start: 2021-07-27 | End: 2021-07-28 | Stop reason: HOSPADM

## 2021-07-27 RX ORDER — IMATINIB MESYLATE 400 MG/1
400 TABLET, FILM COATED ORAL DAILY
Status: ON HOLD | COMMUNITY
End: 2021-07-28 | Stop reason: HOSPADM

## 2021-07-27 RX ORDER — AMLODIPINE BESYLATE 5 MG/1
5 TABLET ORAL DAILY
Status: ON HOLD | COMMUNITY
End: 2021-07-28 | Stop reason: HOSPADM

## 2021-07-27 RX ORDER — ACETAMINOPHEN 650 MG/1
650 SUPPOSITORY RECTAL ONCE
Status: COMPLETED | OUTPATIENT
Start: 2021-07-27 | End: 2021-07-27

## 2021-07-27 RX ORDER — OXYBUTYNIN CHLORIDE 5 MG/1
5 TABLET ORAL EVERY MORNING
Status: ON HOLD | COMMUNITY
End: 2021-07-28 | Stop reason: HOSPADM

## 2021-07-27 RX ORDER — M-VIT,TX,IRON,MINS/CALC/FOLIC 27MG-0.4MG
1 TABLET ORAL DAILY
Status: ON HOLD | COMMUNITY
End: 2021-07-28 | Stop reason: HOSPADM

## 2021-07-27 RX ORDER — LACOSAMIDE 100 MG/1
200 TABLET ORAL 2 TIMES DAILY
COMMUNITY

## 2021-07-27 RX ORDER — DEXAMETHASONE 4 MG/1
4 TABLET ORAL DAILY
COMMUNITY

## 2021-07-27 RX ORDER — POTASSIUM CHLORIDE 20 MEQ/1
20 TABLET, EXTENDED RELEASE ORAL 2 TIMES DAILY
Status: ON HOLD | COMMUNITY
End: 2021-07-28 | Stop reason: HOSPADM

## 2021-07-27 RX ORDER — SODIUM CHLORIDE 0.9 % (FLUSH) 0.9 %
5-40 SYRINGE (ML) INJECTION EVERY 12 HOURS SCHEDULED
Status: DISCONTINUED | OUTPATIENT
Start: 2021-07-27 | End: 2021-07-28 | Stop reason: HOSPADM

## 2021-07-27 RX ORDER — SODIUM CHLORIDE 0.9 % (FLUSH) 0.9 %
5-40 SYRINGE (ML) INJECTION PRN
Status: DISCONTINUED | OUTPATIENT
Start: 2021-07-27 | End: 2021-07-28 | Stop reason: HOSPADM

## 2021-07-27 RX ORDER — ONDANSETRON 4 MG/1
4 TABLET, ORALLY DISINTEGRATING ORAL EVERY 8 HOURS PRN
Status: ON HOLD | COMMUNITY
End: 2021-07-28 | Stop reason: HOSPADM

## 2021-07-27 RX ORDER — LISINOPRIL AND HYDROCHLOROTHIAZIDE 20; 12.5 MG/1; MG/1
2 TABLET ORAL DAILY
Status: ON HOLD | COMMUNITY
End: 2021-07-28 | Stop reason: HOSPADM

## 2021-07-27 RX ORDER — DEXAMETHASONE SODIUM PHOSPHATE 10 MG/ML
10 INJECTION, SOLUTION INTRAMUSCULAR; INTRAVENOUS ONCE
Status: COMPLETED | OUTPATIENT
Start: 2021-07-27 | End: 2021-07-27

## 2021-07-27 RX ORDER — SODIUM CHLORIDE 9 MG/ML
30 INJECTION, SOLUTION INTRAVENOUS ONCE
Status: COMPLETED | OUTPATIENT
Start: 2021-07-27 | End: 2021-07-27

## 2021-07-27 RX ORDER — ACETAMINOPHEN 325 MG/1
650 TABLET ORAL EVERY 6 HOURS PRN
Status: DISCONTINUED | OUTPATIENT
Start: 2021-07-27 | End: 2021-07-28 | Stop reason: HOSPADM

## 2021-07-27 RX ORDER — SODIUM CHLORIDE 9 MG/ML
INJECTION, SOLUTION INTRAVENOUS CONTINUOUS
Status: DISCONTINUED | OUTPATIENT
Start: 2021-07-27 | End: 2021-07-28

## 2021-07-27 RX ADMIN — LEVETIRACETAM 1000 MG: 10 INJECTION INTRAVENOUS at 09:55

## 2021-07-27 RX ADMIN — AZITHROMYCIN MONOHYDRATE 500 MG: 500 INJECTION, POWDER, LYOPHILIZED, FOR SOLUTION INTRAVENOUS at 10:20

## 2021-07-27 RX ADMIN — CEFEPIME HYDROCHLORIDE 2000 MG: 2 INJECTION, POWDER, FOR SOLUTION INTRAVENOUS at 12:55

## 2021-07-27 RX ADMIN — SODIUM CHLORIDE 3021 ML: 9 INJECTION, SOLUTION INTRAVENOUS at 07:36

## 2021-07-27 RX ADMIN — LEVETIRACETAM 1000 MG: 10 INJECTION INTRAVENOUS at 21:53

## 2021-07-27 RX ADMIN — CEFTRIAXONE 1000 MG: 1 INJECTION, POWDER, FOR SOLUTION INTRAMUSCULAR; INTRAVENOUS at 08:38

## 2021-07-27 RX ADMIN — ACETAMINOPHEN 650 MG: 650 SUPPOSITORY RECTAL at 07:44

## 2021-07-27 RX ADMIN — VANCOMYCIN HYDROCHLORIDE 1250 MG: 10 INJECTION, POWDER, LYOPHILIZED, FOR SOLUTION INTRAVENOUS at 23:07

## 2021-07-27 RX ADMIN — CEFEPIME HYDROCHLORIDE 2000 MG: 2 INJECTION, POWDER, FOR SOLUTION INTRAVENOUS at 20:33

## 2021-07-27 RX ADMIN — DEXTROSE MONOHYDRATE 200 MG: 50 INJECTION, SOLUTION INTRAVENOUS at 13:44

## 2021-07-27 RX ADMIN — SODIUM CHLORIDE: 9 INJECTION, SOLUTION INTRAVENOUS at 21:52

## 2021-07-27 RX ADMIN — VANCOMYCIN HYDROCHLORIDE 1000 MG: 1 INJECTION, POWDER, LYOPHILIZED, FOR SOLUTION INTRAVENOUS at 13:38

## 2021-07-27 RX ADMIN — DEXAMETHASONE SODIUM PHOSPHATE 10 MG: 10 INJECTION, SOLUTION INTRAMUSCULAR; INTRAVENOUS at 09:51

## 2021-07-27 RX ADMIN — SODIUM CHLORIDE: 9 INJECTION, SOLUTION INTRAVENOUS at 12:51

## 2021-07-27 ASSESSMENT — PAIN SCALES - GENERAL
PAINLEVEL_OUTOF10: 0
PAINLEVEL_OUTOF10: 0

## 2021-07-27 NOTE — ED NOTES
Contacted pt's  Sophia Dupont to update on pt condition.  states that when he was visiting her last night she had a deep cough and dyspnea but the nurses checked her SpO2 and it was 98%.  states that this is not her baseline cognitive status and normally she is alert and oriented to self but her birthday has been giving her trouble lately. Since pt diagnosed with brain tumor she has had some intermittent aphasia and is confused as to the year but is normally alert and oriented with some short and long term memory loss. Provider notified.       Дмитрий Montero RN  07/27/21 8699

## 2021-07-27 NOTE — PROGRESS NOTES
Clinical Pharmacy Note  Vancomycin Consult    Silvia Estrada is a 55 y.o. female ordered Vancomycin for HCAP; consult received from Dr. Ling Schaffer to manage therapy. Also receiving Maxipime. Patient Active Problem List   Diagnosis    Brain mass    S/P craniotomy    Hydrocephalus (HCC)    Altered mental state    Hypokalemia    GERD (gastroesophageal reflux disease)    HTN (hypertension)    [facility]rocyt[facility] brain tumor ([facility] [address]    Acute metabolic encephalopathy       Allergies:  Patient has no known allergies. Temp max:  Temp (24hrs), Av.6 °F (38.1 °C), Min:98.7 °F (37.1 °C), Max:102.7 °F (39.3 °C)      Recent Labs     21  0707   WBC 5.6       Recent Labs     21  0707   BUN 22*   CREATININE 0.5*         Intake/Output Summary (Last 24 hours) at 2021 1201  Last data filed at 2021 1051  Gross per 24 hour   Intake 4972.14 ml   Output --   Net 4972.14 ml       Culture Results:  Ordered a MRSA swab    Ht Readings from Last 1 Encounters:   21 5' 3\" (1.6 m)        Wt Readings from Last 1 Encounters:   21 222 lb 0.1 oz (100.7 kg)         Estimated Creatinine Clearance: 144 mL/min (A) (based on SCr of 0.5 mg/dL (L)). Assessment/Plan:  Vancomycin 1000 mg IV every 12 hours ordered. Regimen projects a trough level of 15-20 mg/L. Level ordered for 21 @ 1200     Thank you for the consult.    Lorie Giordano, [address] [facility]

## 2021-07-27 NOTE — ED NOTES
After multiple attempts to collect blood cultures only one set was obtained. Per Dr. Isela Perdomo abx are ok to be administered and lab was called to attempt a second set.       Carmen Hollis RN  07/27/21 0254

## 2021-07-27 NOTE — PROGRESS NOTES
Speech Language Pathology  Facility/Department: [address] CARE   CLINICAL BEDSIDE SWALLOW EVALUATION    NAME: Annette Coleman  : 1965  MRN: [id number]    ADMISSION DATE: 2021  ADMITTING DIAGNOSIS: Acute metabolic encephalopathy [M52.63]     Annette Coleman has Brain mass; S/P craniotomy; Hydrocephalus ([facility] [facility] Altered mental state; Hypokalemia; GERD (gastroesophageal reflux disease); HTN (hypertension); Astrocytoma brain tumor ([facility] and Acute metabolic encephalopathy on their problem list.    ONSET DATE: 2021    CHART REVIEW:  2021 admitted with AMS: ADMISSION H&P HPI:  The patient is a 55 y.o. female who presents to [facility] with altered mental status. Patient has a history of glioblastoma status post chemoradiation along with adjuvant therapy. Patient overall has continued to decline over the past year or so. Her primary neurooncologist has recommended hospice and the patient's  is not mentally there yet. She is been residing in a nursing home and has been more altered and confused the past few days. She came to the emergency department was found to have an abnormal urinalysis along with multifocal pneumonia. Patient will be admitted to the hospital for further care and evaluation. After discussion with the  he states for the time being he would like for his wife to remain a full code. We then went over CPR, intubation, chest compressions and he states she would still like for all things to be done at this current time. He says he will touch base with his children along with other family members to discuss CODE STATUS along with possible hospice care. 2021 CXR: Multifocal pneumonia. 2021 CT HEAD:   Impression   No acute intracranial abnormality.  Multifocal partially calcified   intracranial malignancies which have significantly increased in size since   2020.  Moderate cerebral edema and mass effect caused by the lesions.     Lesion has been previously biopsied although the pathology is unclear from   the reports in EPIC.     7/27/2021 COVID-19 r/o in process      Dysphagia history:  4/6/2021 most recent ST note in EMR review:  Assessment: Pt with oropharyngeal dysphagia secondary to perioral weakness with reduced mastication of hard solids and delayed swallow initiation with delayed laryngeal closure, complicated by cognitive status resulting in observed trace overt aspiration of chain swallow thin liquids x1 only on MBS completed 3/31. Today pt demonstrates continued s/s of aspiration with chain swallows of thin liquid. When cued to take small single sips of thin liquid, no s/s observed. Pt demonstrates improved mastication and oral clearance of hard solids. Rx upgrade to regular diet with thin liquids, small single sips. Patient is at an increased risk of aspiration. Regular diet with thin liquids at [facility] prior to admission    Date of Eval: 7/27/2021  Evaluating Therapist: Vicky Ferguson SLP    Current Diet level:  Current Diet : Regular  Current Liquid Diet : Thin      Primary Complaint  Patient Complaint: patient unable to generate complaint; spouse reports coughing with thin liquids; RN reports concern for patient being administered PO with decreased level of alertness    Pain:  Pain Assessment  Pain Level:  (Fever 102.7)    Reason for Referral  Keith Leija was referred for a bedside swallow evaluation to assess the efficiency of her swallow function, identify signs and symptoms of aspiration and make recommendations regarding safe dietary consistencies, effective compensatory strategies, and safe eating environment. Impression  Dysphagia Diagnosis: Moderate to severe oral stage dysphagia; Moderate to severe pharyngeal stage dysphagia; Suspected needs further assessment  · Patient met at bedside with spouse present.    · Spouse reports patient with lethargic tendency but reports patient typically awakens easily and maintain wakefulness when offered PO. Patient asleep upon SLP entry to room but awakens with verbal and tactile stim; requires max cues to maintain wakefulness for PO trials. after awakening, patient able to state name and responsive to spouse. · PO trials administered d/t concern for reported signs/symptoms of penetration/aspiration with PO and reported need to completed swallow assessment if able. Accepts initial PO trial and swallows without cueing for prep; proceeded with PO trials and swallow assessment despite lethargy d/t observed intact habitual swallow function with initial PO presentation. · At this time, patient appears to present with moderate-severe oropharyngeal dysphagia characterized by apparent generalized weakness and reduced endurance resulting in:  prolonged bout adequate bolus formation and movement with puree and liquids; Concern for reduced bolus control with high risk for premature loss to the pharynx and potential for penetration/aspiration before the swallow with nectar and thin; No oral residue; Delayed swallow and decreased laryngeal elevation  · No overt signs/symptoms of penetration/aspiration but concern for reduced pharyngeal control with thin and nectar when assessed via digital palpation. · Recommend NPO unless able to maintain adequate level of alertness for sustained PO; if level of alertness does improve, recommend consideration for puree/honey pending continued SLP assessment. Dysphagia Outcome Severity Scale: Level 2: Moderate Severe dysphagia- Maximum assistance or maximum use of strategies with partial PO only     Treatment Plan  Requires SLP Intervention: Yes  Duration/Frequency of Treatment: ST to tx 3-5 times per week during acute admission  D/C Recommendations:  To be determined    Recommended Diet and Intervention  Diet Solids Recommendation: NPO vs Dysphagia Pureed (Dysphagia I)  Liquid Consistency Recommendation: NPO vs Moderately Thick (Honey)  Recommended Form of Meds: Crushed in puree as able     Therapeutic Interventions: Diet tolerance monitoring;Patient/Family education; Therapeutic PO trials with SLP    Compensatory Swallowing Strategies  Compensatory Swallowing Strategies: Upright as possible for all oral intake;Remain upright for 30-45 minutes after meals;Eat/Feed slowly; No straws;Small bites/sips; Total feed (PO ONLY IF ALERT AND ABLE TO MAINTAIN ADEQUATE LEVEL OF ALERTNESS FOR SUTAINED PO)    Treatment/Goals  Dysphagia Goals: The patient will tolerate repeat BSE when able. General  Chart Reviewed: Yes  Behavior/Cognition: Alert;Lethargic;Requires cueing;Doesn't follow directions  Communication Observation: Dysarthria  Follows Directions:  (requires max cues d/t decreased level of alertness)  Patient Positioning: Upright in bed  Baseline Vocal Quality: Weak;Dysphonic  Volitional Cough: Weak  Volitional Swallow: Delayed  Consistencies Administered: Dysphagia Pureed (Dysphagia I); Honey - cup;Nectar - cup; Thin - cup    Vision/Hearing  Vision:  (concern for impairments)  Hearing: Within functional limits    Oral Motor Deficits  Oral Motor:  (doesn't maintain adequate sustained attention to forma oral motor exam)  Labial Strength: Reduced  Labial Coordination: Reduced  Lingual Strength: Reduced  Lingual Coordination: Reduced  Mandible: Impaired    Oral Phase Dysfunction  Impaired Mastication:  (not assessed)  Decreased Anterior to Posterior Transit: All  Suspected Premature Bolus Loss: All     Indicators of Pharyngeal Phase Dysfunction  Delayed Swallow:  All  Decreased Laryngeal Elevation: All  Pharyngeal: no overt sign/symptoms of penetration/aspiration; decreased level of alertness from baseline decreased level of alertness - concern for increased risk for silent aspiration at this time    Prognosis  Prognosis for safe diet advancement: fair  Barriers to reach goals: severity of dysphagia;inconsistent alertness (medical co-morbidities)  Consulted and agree with

## 2021-07-27 NOTE — ED NOTES
Spoke with the pt's  who stated that the last time that pt went unresponsive was when she had seizures in March of this year.  states that the pt is on Keppra and another anti seiure med. Notified provider of conversation.      Sissy Jaimes RN  07/27/21 [id number]

## 2021-07-27 NOTE — ED NOTES
Spoke with Marcelina Blount RN from facility to update on pt condition. Marcelina Blount states that the pt was within her normal limits until about 5 or 6 this morning. All questions answered.       Tamir Armenta RN  07/27/21 [id number]

## 2021-07-27 NOTE — ED NOTES

## 2021-07-27 NOTE — CONSULTS
[facility] [place]  Palliative Care   Consult Note    NAME:  Inessa Delgado RECORD NUMBER:  [id number]  AGE: 55 y.o. GENDER: female  : 1965  TODAY'S DATE:  2021    Subjective     Reason for Consult:  goals of care, hospice discussion and code status   Visit Type: Initial Consult      Viviane Hall is a 55 y.o. female referred by:  [x] Physician    PAST MEDICAL HISTORY      Diagnosis Date    Brain tumor [facility])     recent memory loss, confusion, persistant headache, nausea, dizzy    History of chemotherapy     History of radiation therapy     Hypertension     Prolonged emergence from general anesthesia        PAST SURGICAL HISTORY  Past Surgical History:   Procedure Laterality Date    CRANIOTOMY Right 2020    RIGHT PARIETAL CRANIOTOMY FOR STEREOTACTIC BIOPSY OF BRAIN MASS performed by Ny Islas. Tiburcio Brown MD at [address] PILONIDAL CYST EXCISION         FAMILY HISTORY  Family History   Problem Relation Age of Onset    Seizures Mother     Cancer Father        SOCIAL HISTORY  Social History     Tobacco Use    Smoking status: Never Smoker    Smokeless tobacco: Never Used   Vaping Use    Vaping Use: Never used   Substance Use Topics    Alcohol use: Yes     Comment: rarely    Drug use: Never       ALLERGIES  No Known Allergies    MEDICATIONS  No current facility-administered medications on file prior to encounter.      Current Outpatient Medications on File Prior to Encounter   Medication Sig Dispense Refill    imatinib (GLEEVEC) 400 MG chemo tablet Take 400 mg by mouth daily      dexamethasone (DECADRON) 4 MG tablet Take 4 mg by mouth daily      amLODIPine (NORVASC) 5 MG tablet Take 5 mg by mouth daily      lisinopril-hydroCHLOROthiazide (ZESTORETIC) 20-12.5 MG per tablet Take 2 tablets by mouth daily      mirabegron (MYRBETRIQ) 50 MG TB24 Take 50 mg by mouth daily      Multiple Vitamins-Minerals (THERAPEUTIC MULTIVITAMIN-MINERALS) tablet Take 1 tablet by mouth daily      potassium chloride (KLOR-CON M) 20 MEQ extended release tablet Take 20 mEq by mouth 2 times daily      oxybutynin (DITROPAN) 5 MG tablet Take 5 mg by mouth every morning      ondansetron (ZOFRAN-ODT) 4 MG disintegrating tablet Take 4 mg by mouth every 8 hours as needed for Nausea or Vomiting      lacosamide (VIMPAT) 100 MG TABS tablet Take 200 mg by mouth 2 times daily.  venlafaxine (EFFEXOR XR) 75 MG extended release capsule Take 75 mg by mouth daily      pantoprazole (PROTONIX) 40 MG tablet Take 1 tablet by mouth every morning (before breakfast) 30 tablet 3    levETIRAcetam (KEPPRA) 500 MG tablet Take 1,000 mg by mouth 2 times daily          Objective         /74   Pulse 83   Temp 97.3 °F (36.3 °C) (Axillary)   Resp 18   Ht 5' 3\" (1.6 m)   Wt 222 lb 0.1 oz (100.7 kg)   LMP 06/25/2019 (Within Months)   SpO2 98%   BMI 39.33 kg/m²     Code Status: DNR-CC    Advanced Directives: not completed     Assessment        Management and Education    Persons available for education:     [] Self     [] Caregiver       [x] Spouse       [] Other Family Member   []  Other    Spiritual History:  notified: Yes,     Does the patient have a Primary Care Physician?   Yes  Level of patient/caregiver understanding able to:       [x] Verbalize Understanding   [] Demonstrate Understanding       [] Teach Back       [] Needs Reinforcement     []  Other:      Teaching Time:  1hours  0 minutes     Plan        Social Service Consult Made:  Yes  Assistance filling out Living Will/HPOA:  No      Discharge Plan:  Education/support to family  Providing support for coping/adaptation/distress of family  Decision making regarding life prolonging treatment  Code status clarified: [facility]  Palliative care orders introduced  Provided information about hospice    Discharge Environment:  [x] Hospice Consult Agency: List provided chose [facility]  [x] Inpatient Hospice vs   [x] Home with Hospice Care     Discussion: Patient admitted for AMS from ECF, has history of glioblastoma status post chemo/radiation. She has had a general decline and was in ECF for past week for therapy, in ED found to have UTI and pneumonia. Her  has met with neurology and discussed current scans done today. He was able to speak to the patients mother and sister and they are in agreement for change in code status to [facility] and pursuing hospice care. He would prefer for her not to return to [facility] but is not sure where hospice care would take place inpatient vs home. He may want to continue current treatment of antibiotics before moving to hospice will discuss with family. Referral to [facility] will be made per his request.   Dr Darya Chapman and Justin Pierce bedside nurse informed of above orders noted. I will continue to follow Ms. Gunter's care as needed. Thank you for allowing me to participate in the care of Ms. Jeanmarie Duckworth .      Electronically signed by Josh Dejesus RN, [facility] on 7/27/2021 at 6:49 PM  [address] [place] [address]  Office: [phone]

## 2021-07-27 NOTE — PROGRESS NOTES
Patient came in with AMS. 85% on RA and was put on 4L of O2 in ED. Has recent history of glioblastoma. Patient kept trying to take oxygen tubing off. Per patient's , patient had pulled out Perez in the past and requested restraint to prevent her from pulling out lines and tubing.

## 2021-07-27 NOTE — ED NOTES
Attempted to contact [address] Unable to reach RN or leave message to request a return call due to mailbox being full.       Niranjan Lewis RN  07/27/21 [id number]

## 2021-07-27 NOTE — CONSULTS
Neurology Consult Note  Reason for Consult: AMS, brain cancer    Chief complaint: unable to obtain from the patient at this time    Dr Agustina Cui MD asked me to see Silvia Estrada in consultation for evaluation of AMS, brain cancer     History of Present Illness:  Silvia Estrada is a 55 y.o. female who presents with AMS. I obtained my information via interview w/ the patient, supplemented by chart review. In February 2020 the patient was diagnosed w/ grade IV GBM. She is status post R craniotomy and has undergone chemotherapy and radiation. Most recent brain imaging showed disease progression and it was just recently recommended that the patient and her family consider Hospice. The patient has been at a nursing home. At baseline she has some dysfunction of her R side and aphasia though is capable of communicating. Her  says that she had a cough and was started to feel short of breath, eventually becoming less responsive which prompted EMS to be called. Initial temperature was 102.7F. She was found to have pneumonia and possibly a UTI. COVID was negative. Currenlty, she remains encephalopathic though her  says she has improved a bit since treatment was initiated. Medical History:  Past Medical History:   Diagnosis Date    Brain tumor [facility])     recent memory loss, confusion, persistant headache, nausea, dizzy    History of chemotherapy     History of radiation therapy     Hypertension     Prolonged emergence from general anesthesia      Past Surgical History:   Procedure Laterality Date    CRANIOTOMY Right 2/26/2020    RIGHT PARIETAL CRANIOTOMY FOR STEREOTACTIC BIOPSY OF BRAIN MASS performed by Sandra Arellano.  Tiburcio Brown MD at [facility] CYST EXCISION       Scheduled Meds:   sodium chloride flush  5-40 mL Intravenous 2 times per day    cefepime  2,000 mg Intravenous Q8H    vancomycin  15 mg/kg Intravenous Q12H Medications Prior to Admission:   imatinib (GLEEVEC) 400 MG chemo tablet, Take 400 mg by mouth daily  dexamethasone (DECADRON) 4 MG tablet, Take 4 mg by mouth daily  amLODIPine (NORVASC) 5 MG tablet, Take 5 mg by mouth daily  lisinopril-hydroCHLOROthiazide (ZESTORETIC) 20-12.5 MG per tablet, Take 2 tablets by mouth daily  mirabegron (MYRBETRIQ) 50 MG TB24, Take 50 mg by mouth daily  Multiple Vitamins-Minerals (THERAPEUTIC MULTIVITAMIN-MINERALS) tablet, Take 1 tablet by mouth daily  potassium chloride (KLOR-CON M) 20 MEQ extended release tablet, Take 20 mEq by mouth 2 times daily  oxybutynin (DITROPAN) 5 MG tablet, Take 5 mg by mouth every morning  ondansetron (ZOFRAN-ODT) 4 MG disintegrating tablet, Take 4 mg by mouth every 8 hours as needed for Nausea or Vomiting  lacosamide (VIMPAT) 100 MG TABS tablet, Take 200 mg by mouth 2 times daily. venlafaxine (EFFEXOR XR) 75 MG extended release capsule, Take 75 mg by mouth daily  pantoprazole (PROTONIX) 40 MG tablet, Take 1 tablet by mouth every morning (before breakfast)  levETIRAcetam (KEPPRA) 500 MG tablet, Take 1,000 mg by mouth 2 times daily     No Known Allergies    Family History   Problem Relation Age of Onset    Seizures Mother     Cancer Father      Social History     Tobacco Use   Smoking Status Never Smoker   Smokeless Tobacco Never Used     Social History     Substance and Sexual Activity   Drug Use Never     Social History     Substance and Sexual Activity   Alcohol Use Yes    Comment: rarely     ROS: chart reviewed. Unable to obtain from the patient at this time. Exam:  Blood pressure (!) 138/51, pulse 86, temperature 100.4 °F (38 °C), temperature source Rectal, resp. rate 28, weight 222 lb 0.1 oz (100.7 kg), last menstrual period 06/25/2019, SpO2 97 %. Constitutional    Vital signs: BP, HR, and RR reviewed   General lethargic. Eyes: unable to visualize the fundi  Cardiovascular: + peripheral edema.   Psychiatric: no psychotic behavior noted.  Neurologic  Mental status:   orientation person. Mel White other orientation questions. General fund of knowledge kendra due to encephalopathy. Memory kendra due to encephalopathy. Attention  poor   Language difficult to assess given encephalopathy, baseline aphasia. Comprehension not consistently following commands at this time. Cranial nerves:   CN2: kendra due to encephalopathy. CN 3,4,6: dysconjugate gaze. No forced gaze deviation. Pupils are equal, round, reactive bilaterally. CN5: facial sensation kendra. CN7: face grossly symmetric. CN8: hearing grossly intact  CN9: palate elevation kendra. CN11: trap strength kendra. CN12: tongue protrusion kendra. Strength: baseline R sided motor deficit though difficult to assess due to encephalopathy. She will hand grasp on the L and elevate part of the arm antigravity. Sensory: she localizes and is reactive to noxious pain stimulus. Cerebellar/coordination: finger nose finger kendra. Tone: no increased tone or rigidity. Gait: deferred at this time due to encephalopathy. Labs  Glucose 119  Na 136  K 3.5  Mg 1.80  BUN 22  Cr 0.5    ALT 59  AST 47    Lactic acid 2.7    WBC 5.6K  Hg 13.4  Platelets 62    Procalcitonin 0.29    COVID negative  UA + nitrites, small LE, 6 WBCs, 4+ bacteria. Blood cultures pending. Studies  CT head w/o 7/27/21, independently reviewed  1. No acute intracranial abnormality.  Multifocal partially calcified   intracranial malignancies which have significantly increased in size since   12/07/2020.  Moderate cerebral edema and mass effect caused by the lesions. 2. Lesion has been previously biopsied although the pathology is unclear from   the reports in EPIC. Impression  1. Acute encephalopathy likely related to sepsis (PNA, UTI) in the setting of progressive glioblastoma. 2.  GBM. 3.  Pneumonia. 4.  Urinary tract infection. Recommendations  1. Treat infection.     2.  Have restarted

## 2021-07-27 NOTE — PROGRESS NOTES
Medication Reconciliation    List of medications patient is currently taking is complete. Source of information: 1. Conversation with patient's family at bedside                                      2. EPIC records                                       3. Medication records from [address] [place] [address]  Patient has no known allergies. Patient did not receive any of her home medications prior to arrival to the emergency department today.      Cuco Mukherjee RPH, PharmD, BCPS  7/27/2021 10:42 AM

## 2021-07-27 NOTE — ED NOTES
Pt responding to speech by following with her eyes and slightly turning her head. Pt was able to mumble \"Ya. \" in response to the question \"Can you hear me? \" Pt able to weakly squeeze with her left hand but not her right. Pt is normally weak on her right side from the tumor.       Zac Huddleston RN  07/27/21 5773

## 2021-07-27 NOTE — PLAN OF CARE
Problem: Falls - Risk of:  Goal: Will remain free from falls  Description: Will remain free from falls  Outcome: Ongoing  Goal: Absence of physical injury  Description: Absence of physical injury  Outcome: Ongoing     Problem: Skin Integrity:  Goal: Will show no infection signs and symptoms  Description: Will show no infection signs and symptoms  Outcome: Ongoing  Goal: Absence of new skin breakdown  Description: Absence of new skin breakdown  Outcome: Ongoing     Problem: Non-Violent Restraints  Goal: Removal from restraints as soon as assessed to be safe  Outcome: Ongoing  Goal: No harm/injury to patient while restraints in use  Outcome: Ongoing  Goal: Patient's dignity will be maintained  Outcome: Ongoing

## 2021-07-27 NOTE — PROGRESS NOTES
4 Eyes Skin Assessment     NAME:  Emi Neil  YOB: 1965  MEDICAL RECORD NUMBER:  [id number]    The patient is being assess for  Admission    I agree that 2 RN's have performed a thorough Head to Toe Skin Assessment on the patient. ALL assessment sites listed below have been assessed. Areas assessed by both nurses:    Head, Face, Ears, Shoulders, Back, Chest, Arms, Elbows, Hands, Sacrum. Buttock, Coccyx, Ischium and Legs. Feet and Heels    Redness on buttocks. Stage 2 pressure ulcer on coccyx/gluteal folds. Does the Patient have a Wound?  No noted wound(s)       Yuri Prevention initiated:  Yes   Wound Care Orders initiated:  No    Pressure Injury (Stage 3,4, Unstageable, DTI, NWPT, and Complex wounds) if present place consult order under [de-identified] No    New and Established Ostomies if present place consult order under : No      Nurse 1 eSignature: Electronically signed by Alex Osei RN on 7/27/21 at 7:28 PM EDT    **SHARE this note so that the co-signing nurse is able to place an eSignature**    Nurse 2 eSignature: {Esignature:[id number]}

## 2021-07-27 NOTE — ED NOTES
Pt's  Melinda Heading at bedside.       [facility], RN  07/27/21 5804

## 2021-07-27 NOTE — ED TRIAGE NOTES
Pt arrived to dept via EMS from [place]. Pt sent in due to altered mental status. Upon arrival pt alert to painful stimuli only. Skin warm and dry/normal color for ethnicity. Resp rapid but unlabored, pt hypoxic on room air upon arrival and placed on 4 l/min nasal cannula which brought it up to 96%. Deisy Estevez Pt placed in gown and on cardiac monitor. Call light in reach. Will continue to monitor.

## 2021-07-27 NOTE — H&P
Hospital Medicine History & Physical      PCP: Karina Brown MD    Date of Admission: 7/27/2021    Date of Service: Pt seen/examined on 7/27/21 and Admitted to Inpatient     Chief Complaint: Altered mental status    History Of Present Illness: The patient is a 55 y.o. female who presents to [facility] with altered mental status. Patient has a history of glioblastoma status post chemoradiation along with adjuvant therapy. Patient overall has continued to decline over the past year or so. Her primary neurooncologist has recommended hospice and the patient's  is not mentally there yet. She is been residing in a nursing home and has been more altered and confused the past few days. She came to the emergency department was found to have an abnormal urinalysis along with multifocal pneumonia. Patient will be admitted to the hospital for further care and evaluation. After discussion with the  he states for the time being he would like for his wife to remain a full code. We then went over CPR, intubation, chest compressions and he states she would still like for all things to be done at this current time. He says he will touch base with his children along with other family members to discuss CODE STATUS along with possible hospice care. Past Medical History:        Diagnosis Date    Brain tumor [facility])     recent memory loss, confusion, persistant headache, nausea, dizzy    History of chemotherapy     History of radiation therapy     Hypertension     Prolonged emergence from general anesthesia        Past Surgical History:        Procedure Laterality Date    CRANIOTOMY Right 2/26/2020    RIGHT PARIETAL CRANIOTOMY FOR STEREOTACTIC BIOPSY OF BRAIN MASS performed by Jennifer Garcia.  Tiburcio Brown MD at [address] CYST EXCISION         Medications Prior to Admission:    Prior to Admission medications    Medication Sig Start Date End Date Taking? Authorizing Provider   imatinib (GLEEVEC) 400 MG chemo tablet Take 400 mg by mouth daily   Yes Historical Provider, MD   dexamethasone (DECADRON) 4 MG tablet Take 4 mg by mouth daily   Yes Historical Provider, MD   amLODIPine (NORVASC) 5 MG tablet Take 5 mg by mouth daily   Yes Historical Provider, MD   lisinopril-hydroCHLOROthiazide (ZESTORETIC) 20-12.5 MG per tablet Take 2 tablets by mouth daily   Yes Historical Provider, MD   mirabegron (MYRBETRIQ) 50 MG TB24 Take 50 mg by mouth daily   Yes Historical Provider, MD   Multiple Vitamins-Minerals (THERAPEUTIC MULTIVITAMIN-MINERALS) tablet Take 1 tablet by mouth daily   Yes Historical Provider, MD   potassium chloride (KLOR-CON M) 20 MEQ extended release tablet Take 20 mEq by mouth 2 times daily   Yes Historical Provider, MD   oxybutynin (DITROPAN) 5 MG tablet Take 5 mg by mouth every morning   Yes Historical Provider, MD   ondansetron (ZOFRAN-ODT) 4 MG disintegrating tablet Take 4 mg by mouth every 8 hours as needed for Nausea or Vomiting   Yes Historical Provider, MD   lacosamide (VIMPAT) 100 MG TABS tablet Take 200 mg by mouth 2 times daily. Yes Historical Provider, MD   venlafaxine (EFFEXOR XR) 75 MG extended release capsule Take 75 mg by mouth daily   Yes Historical Provider, MD   pantoprazole (PROTONIX) 40 MG tablet Take 1 tablet by mouth every morning (before breakfast) 2/29/20  Yes JEN Kline CNP   levETIRAcetam (KEPPRA) 500 MG tablet Take 1,000 mg by mouth 2 times daily  2/21/20  Yes Historical Provider, MD       Allergies:  Patient has no known allergies. Social History:  The patient currently lives in a nursing home    TOBACCO:   reports that she has never smoked. She has never used smokeless tobacco.  ETOH:   reports current alcohol use.       Family History:  Reviewed in detail and negative for DM, Early CAD, Cancer, CVA. Positive as follows:        Problem Relation Age of Onset    Seizures Mother     Cancer Father        REVIEW OF SYSTEMS:   Positive for as noted in the HPI. All other systems reviewed and negative. PHYSICAL EXAM:    /72   Pulse 99   Temp 98.7 °F (37.1 °C) (Axillary)   Resp 24   Ht 5' 3\" (1.6 m)   Wt 222 lb 0.1 oz (100.7 kg)   LMP 06/25/2019 (Within Months)   SpO2 98%   BMI 39.33 kg/m²     General appearance: No apparent distress confused  HEENT Normal cephalic, atraumatic without obvious deformity. Pupils equal, round, and reactive to light. Extra ocular muscles intact. Conjunctivae/corneas clear. Neck: Supple, No jugular venous distention/bruits. Trachea midline without thyromegaly or adenopathy with full range of motion. Lungs: Diminished breath sounds bilateral  Heart: Regular rate and rhythm with Normal S1/S2  Abdomen: Soft, non-tender or non-distended without rigidity  Extremities: No clubbing, cyanosis, or edema bilaterally. Cold   skin: Skin color, texture, turgor normal.  No rashes or lesions. Neurologic: Alert but minimally responsive  Mental status: Alert but minimally responsive  Capillary Refill: Acceptable  < 3 seconds  Peripheral Pulses: +3 Easily felt, not easily obliterated with pressure      CXR:  I have reviewed the CXR with the following interpretation: Multifocal pneumonia    EKG:  I have reviewed the EKG with the following interpretation: Sinus tachycardia    CBC   Recent Labs     07/27/21  0707   WBC 5.6   HGB 13.4   HCT 38.7   PLT 62*      RENAL  Recent Labs     07/27/21  0707      K 3.5      CO2 24   BUN 22*   CREATININE 0.5*     LFT'S  Recent Labs     07/27/21  0707   AST 47*   ALT 59*   BILITOT 0.7   ALKPHOS 80     COAG  No results for input(s): INR in the last 72 hours.   CARDIAC ENZYMES  Recent Labs     07/27/21  0707   TROPONINI <0.01       U/A:    Lab Results   Component Value Date    COLORU YELLOW 07/27/2021    WBCUA 6 07/27/2021 RBCUA 0-2 07/27/2021    BACTERIA 4+ 07/27/2021    CLARITYU CLOUDY 07/27/2021    SPECGRAV 1.022 07/27/2021    LEUKOCYTESUR SMALL 07/27/2021    BLOODU Negative 07/27/2021    GLUCOSEU Negative 07/27/2021       ABG  No results found for: QGD0FMO, BEART, D8PCHJVM, PHART, THGBART, ZBN8YLT, PO2ART, EQH6PWE      PHYSICIANS CERTIFICATION:    I certify that Keith Leija is expected to be hospitalized for more than 2 midnights based on the following assessment and plan:      ASSESSMENT/PLAN:    Sepsis  Secondary to pneumonia along with urinary tract infection  Await culture and sensitivity  IV fluids  Trend lactic acid  Continue broad-spectrum IV antibiotics  Blood and urine cultures    Multifocal pneumonia  Treat as HCAP in the setting of an immunocompromise patient  Rapid Covid negative, PCR added on  Blood and urine cultures  Broad-spectrum IV antibiotics    Urinary tract infection  Await culture and sensitivity  Continue broad-spectrum IV antibiotics    Acute metabolic encephalopathy  Secondary to sepsis along with glioblastoma  Continue to monitor    Glioblastoma  Status post chemoradiation  No further treatment offered  Recommend hospice    DVT Prophylaxis: Lovenox  Diet: No diet orders on file  Code Status: Full Code  PT/OT Eval Status: Not applicable    Dispo -inpatient       Manoj Doyle MD    Thank you Nash Quezada MD for the opportunity to be involved in this patient's care. If you have any questions or concerns please feel free to contact me at [phone].

## 2021-07-27 NOTE — ED PROVIDER NOTES
Never Smoker    Smokeless tobacco: Never Used   Vaping Use    Vaping Use: Never used   Substance and Sexual Activity    Alcohol use: Yes     Comment: rarely    Drug use: Never    Sexual activity: None   Other Topics Concern    None   Social History Narrative    None     Social Determinants of Health     Financial Resource Strain:     Difficulty of Paying Living Expenses:    Food Insecurity:     Worried About Running Out of Food in the Last Year:     [address] in the Last Year:    Transportation Needs:     Lack of Transportation (Medical):  Lack of Transportation (Non-Medical):    Physical Activity:     Days of Exercise per Week:     Minutes of Exercise per Session:    Stress:     Feeling of Stress :    Social Connections:     Frequency of Communication with Friends and Family:     Frequency of Social Gatherings with Friends and Family:     Attends [employer] Services:     Active Member of Clubs or Organizations:     Attends Club or Organization Meetings:     Marital Status:    Intimate Partner Violence:     Fear of Current or Ex-Partner:     Emotionally Abused:     Physically Abused:     Sexually Abused:        SCREENINGS    [place] Coma Scale  Eye Opening: None  Best Verbal Response: None  Best Motor Response: Localizes pain  [place] Coma Scale Score: 7      PHYSICAL EXAM    (up to 7 for level 4, 8 or more for level 5)     ED Triage Vitals   BP Temp Temp src Pulse Resp SpO2 Height Weight   07/27/21 0653 -- -- 07/27/21 0653 07/27/21 0653 07/27/21 0653 -- 07/27/21 0704   107/79   122 (!) 32 (!) 87 %  222 lb 0.1 oz (100.7 kg)       Physical Exam    General: Patient is obtunded but responds to painful stimuli with grunting and withdrawal.  Nontoxic appearance. Chronically ill looking patient who is moderately obese unable to talk to me. HEENT: Normocephalic atraumatic. Neck is supple. Airway intact. No adenopathy. Both pupils were dilated and sluggish to light reflex.   Cardiac: Rapid rate and regular rhythm with no murmurs rubs or gallops  Pulmonary: Lungs are clear in all lung fields. No wheezing. No Rales. Abdomen: Soft and nontender. Negative hepatosplenomegaly. Bowel sounds are active  Extremities: There is no calf swelling or calf tenderness. Peripheral pulses all intact. Multiple bruising noted. Skin: No skin lesions. No rashes  Neurologic: Patient is obtunded. But responds and withdraws to painful stimuli. Unable to elicit any other verbal stimuli. Psychiatric: Unable to assess. DIAGNOSTIC RESULTS     EKG (Per Emergency Physician):     Sinus tachycardia at a rate of 120 FL interval is 114 with a QRS of 70 otherwise normal EKG    RADIOLOGY (Per Emergency Physician): Interpretation per the Radiologist below, if available at the time of this note:  CT Head WO Contrast    Result Date: 7/27/2021  EXAMINATION: CT OF THE HEAD WITHOUT CONTRAST  7/27/2021 7:24 am TECHNIQUE: CT of the head was performed without the administration of intravenous contrast. Dose modulation, iterative reconstruction, and/or weight based adjustment of the mA/kV was utilized to reduce the radiation dose to as low as reasonably achievable. COMPARISON: MRI 12/07/2020 and prior CT head 07/26/2020 HISTORY: ORDERING SYSTEM PROVIDED HISTORY: AMS TECHNOLOGIST PROVIDED HISTORY: Reason for exam:->AMS Has a \"code stroke\" or \"stroke alert\" been called? ->No Decision Support Exception - unselect if not a suspected or confirmed emergency medical condition->Emergency Medical Condition (MA) Is the patient pregnant?->No Reason for Exam: AMS Acuity: Acute Type of Exam: Initial History of brain tumor with chemotherapy and radiation therapy. FINDINGS: BRAIN/VENTRICLES: There is no acute intracranial hemorrhage or acute infarct appreciated. Several sizable partially calcified masses are present.   The largest mass is centered on the minor forceps crossing the midline splaying the atria of the lateral ventricles. Transverse diameter measured at about 53 mm. AP diameter about 38 mm and height about 26 mm. The lesion appears to extend into the posteromedial right temporal lobe. 2 similar appearing masses are present in the mid and posterior left parietal lobe. The more posterior lesion measures about 34 mm across, 31 mm AP and 30 mm in height. The adjacent more anterior mass measures about 32 mm across, 29 mm AP, and 33 mm in height. A smaller nearly completely calcified lesion is present anteriorly in the cortex of the left parietal lobe measuring about a cm size. All of the lesions appear to have increased significantly in size since the MRI. No abnormal extra-axial fluid collection. A mild-to-moderate amount cerebral edema appears to surround each of the lesions. There is no evidence of hydrocephalus. ORBITS: The visualized portion of the orbits demonstrate no acute abnormality. SINUSES: The visualized paranasal sinuses and mastoid air cells demonstrate no acute abnormality. SOFT TISSUES/SKULL:  No acute abnormality of the visualized skull or soft tissues. [place] hole noted in the posterior right parietal skull. No acute intracranial abnormality. Multifocal partially calcified intracranial malignancies which have significantly increased in size since 12/07/2020. Moderate cerebral edema and mass effect caused by the lesions. Lesion has been previously biopsied although the pathology is unclear from the reports in [facility]. XR CHEST PORTABLE    Result Date: 7/27/2021  EXAMINATION: ONE XRAY VIEW OF THE CHEST 7/27/2021 7:22 am COMPARISON: None. HISTORY: ORDERING SYSTEM PROVIDED HISTORY: AMS TECHNOLOGIST PROVIDED HISTORY: Reason for exam:->AMS Reason for Exam: AMS FINDINGS: There is patchy multifocal bilateral ground-glass opacity. There is no pneumothorax or effusion. Heart size and vascularity are within normal limits. Multifocal pneumonia.        ED BEDSIDE ULTRASOUND:   Performed by ED Physician - none    LABS:  Labs Reviewed   CBC WITH AUTO DIFFERENTIAL - Abnormal; Notable for the following components:       Result Value    RBC 3.92 (*)     MCH 34.1 (*)     RDW 19.3 (*)     Platelets 62 (*)     Lymphocytes Absolute 0.3 (*)     All other components within normal limits    Narrative:     Performed at:  [address] [place]   Phone [phone]   COMPREHENSIVE METABOLIC PANEL W/ REFLEX TO MG FOR LOW K - Abnormal; Notable for the following components:    Glucose 119 (*)     BUN 22 (*)     CREATININE 0.5 (*)     Total Protein 5.8 (*)     Albumin 3.1 (*)     ALT 59 (*)     AST 47 (*)     All other components within normal limits    Narrative:     Performed at:  [address] [place]   Phone [phone]   LACTATE, SEPSIS - Abnormal; Notable for the following components:    Lactic Acid, Sepsis 2.0 (*)     All other components within normal limits    Narrative:     Performed at:  [address] [place]   Phone [phone]   BLOOD GAS, VENOUS - Abnormal; Notable for the following components:    pH, Giancarlo 7.482 (*)     pCO2, [place]carlo 38.0 (*)     All other components within normal limits    Narrative:     Performed at:  [address] [place]   Phone [phone]   URINE RT REFLEX TO CULTURE - Abnormal; Notable for the following components:    Clarity, UA CLOUDY (*)     Protein, UA 30 (*)     Urobilinogen, Urine 4.0 (*)     Nitrite, Urine POSITIVE (*)     Leukocyte Esterase, Urine SMALL (*)     All other components within normal limits    Narrative:     Performed at:  [address] [place]   Phone [phone]   MICROSCOPIC URINALYSIS - Abnormal; Notable for the following components:    Bacteria, UA 4+ (*)     WBC, UA 6 (*)     All other components within normal limits    Narrative:     Performed at:  [facility]  [address] [place] [address]   Phone [phone] RAPID    Narrative:     Performed at:  [facility]  [address] [facility] [place] [address]   Phone [phone]   CULTURE, BLOOD 1   CULTURE, BLOOD 2   TROPONIN    Narrative:     Performed at:  [facility]  [address] [facility] [place] [address]   Phone [phone]   MAGNESIUM    Narrative:     Performed at:  [facility]  [address] [place] [address]   Phone [phone]   [address], SEPSIS   COVID-19   COVID-19   COVID-19   COVID-19        All other labs were within normal range or not returned as of this dictation. Procedures      EMERGENCY DEPARTMENT COURSE and DIFFERENTIAL DIAGNOSIS/MDM:   Vitals:    Vitals:    07/27/21 0900 07/27/21 0915 07/27/21 1000 07/27/21 1015   BP: (!) 150/67 (!) 151/92 (!) 144/96 (!) 138/51   Pulse: 107 100 95 100   Resp: 25 25 (!) 31 29   Temp:   100.4 °F (38 °C)    TempSrc:   Rectal    SpO2: 100% 99% 97% 96%   Weight:           Medications   azithromycin (ZITHROMAX) 500 mg in D5W 250ml addavial (500 mg Intravenous New Bag 7/27/21 1020)   acetaminophen (TYLENOL) suppository 650 mg (650 mg Rectal Given 7/27/21 0744)   0.9 % sodium chloride infusion ( Intravenous Rate/Dose Verify 7/27/21 0931)   cefTRIAXone (ROCEPHIN) 1000 mg IVPB in 50 mL D5W minibag ( Intravenous Stopped 7/27/21 0906)   dexamethasone (PF) (DECADRON) injection 10 mg (10 mg Intravenous Given 7/27/21 0951)   levETIRAcetam (KEPPRA) 1000 mg/100 mL IVPB ( Intravenous Stopped 7/27/21 1011)       MDM. Patient is a 61-year-old female with history of stage IV glioblastoma brain tumor Victorine Cheese with altered mental status from an local nursing home. Responds to painful stimuli only. Is nonverbal.  Patient normally baseline is able to talk since yesterday. This is acute change from yesterday. Been has been notified. She does not move the right side front with hemineglect. Patient's CAT scan reveals enlarged tumor with edema x-ray multi focal pneumonia patient is has mild elevated lactate at only 2.0 white count within normal limit. Patient will be treated for sepsis and pneumonia. Rapid Covid is negative surprisingly. I would assume she is probably Covid but she has her vaccine already about 3 months ago. She will need to be admitted I did call to neurology at [facility]. They offer no other suggestion. Patient should be in hospice and there is nothing they can add onto her treatment plan. Patient will need a midline admitted to the hospitalist.  I did start IV antibiotic Zithromax and Rocephin and I started on IV Decadron and Keppra to prevent any seizures. Patient is stable condition at this time. Patient did get septic fluid boluses. REVAL:         CRITICAL CARE TIME   Total CriticalCare time was 30 minutes, excluding separately reportable procedures. There was a high probability of clinically significant/life threatening deterioration in the patient's condition which required my urgent intervention. CONSULTS:  IP CONSULT TO NEUROLOGY    PROCEDURES:  Unless otherwise noted below, none     [unfilled]    FINAL IMPRESSION      1. Altered mental status, unspecified altered mental status type    2. Pneumonia of both lungs due to infectious organism, unspecified part of lung    3. Brain neoplasm ([address]          DISPOSITION/PLAN   DISPOSITION        PATIENT REFERRED TO:  No follow-up provider specified.     DISCHARGE MEDICATIONS:  New Prescriptions    No medications on file          (Please note:  Portions of this note were completed with a voice recognition program.Efforts were made to edit the dictations but occasionally words and phrases are mis-transcribed.)  Form v2016. J.5-cn    KENNY MIHCEL MD (electronically signed)  Emergency Medicine Provider        Flor Hanson MD  07/27/21 4617

## 2021-07-27 NOTE — ACP (ADVANCE CARE PLANNING)
ADVANCED CARE PLANNING    Name:Lillian Dickson       :  1965              MRN:  [id number]      Purpose of Encounter: Advanced care planning in light of incurable cancer. Parties in attendance: :Paez Neighbor, Dejan Guzmán MD, Family members:  Rosalba Qiu. Decisional Capacity:Yes    Diagnosis: Active Problems:    Acute metabolic encephalopathy  Resolved Problems:    * No resolved hospital problems. *    Patients Medical Story: The patient is a 55 y.o. female who presents to [facility] with altered mental status. Patient has a history of glioblastoma status post chemoradiation along with adjuvant therapy. Patient overall has continued to decline over the past year or so. Her primary neurooncologist has recommended hospice and the patient's  is not mentally there yet. After discussion with the  he states for the time being he would like for his wife to remain a full code. We then went over CPR, intubation, chest compressions and he states she would still like for all things to be done at this current time. He says he will touch base with his children along with other family members to discuss CODE STATUS along with possible hospice care. Goals of Care Determinations: Patient wishes to focus on quality of life  Plan: Will notify Louis Russo MD of change in care plan. Will look at further interventions as needed. Code Status: At this time patient wishes to be Full Code  Time Spent with Patient: 16 minutes      Electronically signed by Dejan Guzmán MD on 2021 at 12:56 PM  Thank you Louis Russo MD for the opportunity to be involved in this patient's care.

## 2021-07-27 NOTE — ED NOTES
Pt responsive to pain only. Right pupil responsive to light and left pupil unresponsive to light.       Mireya Daniels RN  07/27/21 8966

## 2021-07-27 NOTE — ACP (ADVANCE CARE PLANNING)
Advance Care Planning     Advance Care Planning Activator (Inpatient)  Conversation Note      Date of ACP Conversation: 7/27/2021     Conversation Conducted with: Patient with [address]: Named in Advance Directive or Healthcare Power of Verenice (name) [facility] [phone]    ACP Activator: Candice [address] 45 Decision Maker:     Current Designated Health Care Decision Maker:   [facility] [phone]  Care Preferences    Ventilation: \"If you were in your present state of health and suddenly became very ill and were unable to breathe on your own, what would your preference be about the use of a ventilator (breathing machine) if it were available to you? \"      Would the patient desire the use of ventilator (breathing machine)?: no    \"If your health worsens and it becomes clear that your chance of recovery is unlikely, what would your preference be about the use of a ventilator (breathing machine) if it were available to you? \"     Would the patient desire the use of ventilator (breathing machine)?: No      Resuscitation  \"CPR works best to restart the heart when there is a sudden event, like a heart attack, in someone who is otherwise healthy. Unfortunately, CPR does not typically restart the heart for people who have serious health conditions or who are very sick. \"    \"In the event your heart stopped as a result of an underlying serious health condition, would you want attempts to be made to restart your heart (answer \"yes\" for attempt to resuscitate) or would you prefer a natural death (answer \"no\" for do not attempt to resuscitate)? \" no    Length of ACP Conversation in minutes:  30 min    Conversation Outcomes:  [x] ACP discussion completed    Electronically signed by Piedad RIVERA, RN, CHPN on 7/27/2021 at 7:09 PM  Palliative Care Nurse  Phone [phone]

## 2021-07-28 VITALS
RESPIRATION RATE: 36 BRPM | WEIGHT: 216.71 LBS | BODY MASS INDEX: 38.4 KG/M2 | OXYGEN SATURATION: 95 % | DIASTOLIC BLOOD PRESSURE: 75 MMHG | TEMPERATURE: 98.7 F | SYSTOLIC BLOOD PRESSURE: 172 MMHG | HEART RATE: 117 BPM | HEIGHT: 63 IN

## 2021-07-28 LAB
ANION GAP SERPL CALCULATED.3IONS-SCNC: 8 MMOL/L (ref 3–16)
BASOPHILS ABSOLUTE: 0 K/UL (ref 0–0.2)
BASOPHILS RELATIVE PERCENT: 0.1 %
BUN BLDV-MCNC: 14 MG/DL (ref 7–20)
CALCIUM SERPL-MCNC: 7.5 MG/DL (ref 8.3–10.6)
CHLORIDE BLD-SCNC: 114 MMOL/L (ref 99–110)
CO2: 23 MMOL/L (ref 21–32)
CREAT SERPL-MCNC: <0.5 MG/DL (ref 0.6–1.1)
EOSINOPHILS ABSOLUTE: 0 K/UL (ref 0–0.6)
EOSINOPHILS RELATIVE PERCENT: 0.1 %
GFR AFRICAN AMERICAN: >60
GFR NON-AFRICAN AMERICAN: >60
GLUCOSE BLD-MCNC: 106 MG/DL (ref 70–99)
HCT VFR BLD CALC: 30.5 % (ref 36–48)
HEMOGLOBIN: 10.5 G/DL (ref 12–16)
LYMPHOCYTES ABSOLUTE: 0.2 K/UL (ref 1–5.1)
LYMPHOCYTES RELATIVE PERCENT: 3.1 %
MAGNESIUM: 1.9 MG/DL (ref 1.8–2.4)
MCH RBC QN AUTO: 34.1 PG (ref 26–34)
MCHC RBC AUTO-ENTMCNC: 34.2 G/DL (ref 31–36)
MCV RBC AUTO: 99.6 FL (ref 80–100)
MONOCYTES ABSOLUTE: 0.1 K/UL (ref 0–1.3)
MONOCYTES RELATIVE PERCENT: 1.1 %
MRSA SCREEN RT-PCR: NORMAL
NEUTROPHILS ABSOLUTE: 5.4 K/UL (ref 1.7–7.7)
NEUTROPHILS RELATIVE PERCENT: 95.6 %
PDW BLD-RTO: 18.7 % (ref 12.4–15.4)
PHOSPHORUS: 2.1 MG/DL (ref 2.5–4.9)
PLATELET # BLD: 48 K/UL (ref 135–450)
PMV BLD AUTO: 7.7 FL (ref 5–10.5)
POTASSIUM REFLEX MAGNESIUM: 3.5 MMOL/L (ref 3.5–5.1)
RBC # BLD: 3.06 M/UL (ref 4–5.2)
REASON FOR REJECTION: NORMAL
REJECTED TEST: NORMAL
SARS-COV-2: NOT DETECTED
SODIUM BLD-SCNC: 145 MMOL/L (ref 136–145)
WBC # BLD: 5.7 K/UL (ref 4–11)

## 2021-07-28 PROCEDURE — 85025 COMPLETE CBC W/AUTO DIFF WBC: CPT

## 2021-07-28 PROCEDURE — 94760 N-INVAS EAR/PLS OXIMETRY 1: CPT

## 2021-07-28 PROCEDURE — 80048 BASIC METABOLIC PNL TOTAL CA: CPT

## 2021-07-28 PROCEDURE — 6370000000 HC RX 637 (ALT 250 FOR IP): Performed by: INTERNAL MEDICINE

## 2021-07-28 PROCEDURE — 36415 COLL VENOUS BLD VENIPUNCTURE: CPT

## 2021-07-28 PROCEDURE — 2580000003 HC RX 258: Performed by: INTERNAL MEDICINE

## 2021-07-28 PROCEDURE — 6360000002 HC RX W HCPCS: Performed by: NURSE PRACTITIONER

## 2021-07-28 PROCEDURE — 83735 ASSAY OF MAGNESIUM: CPT

## 2021-07-28 PROCEDURE — 6360000002 HC RX W HCPCS: Performed by: INTERNAL MEDICINE

## 2021-07-28 PROCEDURE — 2700000000 HC OXYGEN THERAPY PER DAY

## 2021-07-28 PROCEDURE — 84100 ASSAY OF PHOSPHORUS: CPT

## 2021-07-28 PROCEDURE — 2580000003 HC RX 258: Performed by: NURSE PRACTITIONER

## 2021-07-28 PROCEDURE — C9254 INJECTION, LACOSAMIDE: HCPCS | Performed by: NURSE PRACTITIONER

## 2021-07-28 RX ORDER — KETOROLAC TROMETHAMINE 30 MG/ML
30 INJECTION, SOLUTION INTRAMUSCULAR; INTRAVENOUS ONCE
Status: COMPLETED | OUTPATIENT
Start: 2021-07-28 | End: 2021-07-28

## 2021-07-28 RX ADMIN — SODIUM CHLORIDE: 9 INJECTION, SOLUTION INTRAVENOUS at 07:46

## 2021-07-28 RX ADMIN — LEVETIRACETAM 1000 MG: 10 INJECTION INTRAVENOUS at 10:30

## 2021-07-28 RX ADMIN — CEFEPIME HYDROCHLORIDE 2000 MG: 2 INJECTION, POWDER, FOR SOLUTION INTRAVENOUS at 03:41

## 2021-07-28 RX ADMIN — DEXTROSE MONOHYDRATE 200 MG: 50 INJECTION, SOLUTION INTRAVENOUS at 09:40

## 2021-07-28 RX ADMIN — KETOROLAC TROMETHAMINE 30 MG: 30 INJECTION, SOLUTION INTRAMUSCULAR; INTRAVENOUS at 18:30

## 2021-07-28 RX ADMIN — Medication 10 ML: at 10:30

## 2021-07-28 RX ADMIN — ACETAMINOPHEN 650 MG: 325 TABLET ORAL at 16:59

## 2021-07-28 RX ADMIN — DEXTROSE MONOHYDRATE 200 MG: 50 INJECTION, SOLUTION INTRAVENOUS at 01:02

## 2021-07-28 ASSESSMENT — PAIN SCALES - GENERAL
PAINLEVEL_OUTOF10: 3
PAINLEVEL_OUTOF10: 0
PAINLEVEL_OUTOF10: 0

## 2021-07-28 ASSESSMENT — PAIN DESCRIPTION - DESCRIPTORS: DESCRIPTORS: PATIENT UNABLE TO DESCRIBE

## 2021-07-28 ASSESSMENT — PAIN DESCRIPTION - PAIN TYPE: TYPE: ACUTE PAIN

## 2021-07-28 ASSESSMENT — PAIN - FUNCTIONAL ASSESSMENT: PAIN_FUNCTIONAL_ASSESSMENT: PREVENTS OR INTERFERES SOME ACTIVE ACTIVITIES AND ADLS

## 2021-07-28 ASSESSMENT — PAIN DESCRIPTION - FREQUENCY: FREQUENCY: CONTINUOUS

## 2021-07-28 ASSESSMENT — PAIN DESCRIPTION - ONSET: ONSET: UNABLE TO TELL

## 2021-07-28 ASSESSMENT — PAIN DESCRIPTION - ORIENTATION: ORIENTATION: OTHER (COMMENT)

## 2021-07-28 ASSESSMENT — PAIN DESCRIPTION - LOCATION: LOCATION: OTHER (COMMENT)

## 2021-07-28 NOTE — PROGRESS NOTES
Physician Progress Note      Enrique Loo  CSN #:                  [id number]  :                       1965  ADMIT DATE:       2021 6:43 AM  DISCH DATE:  Jeremiah Gold  PROVIDER #:        Bernardino Coppola MD          QUERY TEXT:    Dear Dr Tamika Conklin,    Pt admitted with Sepsis. Pt noted to have pneumonia . If possible, please   document in the progress notes and discharge summary if you are evaluating   and/or treating any of the following:    Note: CAP and HCAP indicate where the pneumonia was acquired, not a specific   type. The medical record reflects the following:  Risk Factors: ecf resident, sepsis, glioblastoma s/p chemo  Clinical Indicators: Documentation per h&p of Multifocal pneumonia Treat as   HCAP. Temp 102.7, RR-32, 87%ra,  Resides in an ECF. Treatment: [email or web address], iv zithromax, iv maxipime, iv rocephin, iv vanc    Thank you, Odalis Schrader RN CDS CRCR  [email or web address]  Options provided:  -- Gram negative pneumonia  -- Gram positive pneumonia  -- MRSA pneumonia  -- MSSA pneumonia  -- Aspiration pneumonia  -- Other - I will add my own diagnosis  -- Disagree - Not applicable / Not valid  -- Disagree - Clinically unable to determine / Unknown  -- Refer to Clinical Documentation Reviewer    PROVIDER RESPONSE TEXT:    This patient has gram negative pneumonia. Query created by:  Marlon Schrader on 2021 1:54 PM      Electronically signed by:  Bernardino Coppola MD 2021 12:30 PM

## 2021-07-28 NOTE — PROGRESS NOTES
Speech Language Pathology    11:40 AM:  Dysphagia tx attempted. Patient and family currently meeting with hospice. ST to re-attempt as schedule permits pending goals/plan of care unless otherwise notified. 1:30 PM: Dysphagia tx re-attempted. Discussed patient with hospice RN and palliative care RN; both indicate no skilled ST needs at this time d/t plan for admission to hospice today. ST to hold unless otherwise notified. Thank you. Nieves Pearson [place], [address] [facility], [id number]  Speech-Language Pathologist  Portable phone: [phone]

## 2021-07-28 NOTE — CARE COORDINATION
[facility] [place] inpatient care center by [facility] at 0499. Sandhya Nava RN, [address], [facility], [address] [place] [address]   O: [phone]  C: [phone]  F: [phone]   Main & Referrals: [phone]   [facility]. [facility]

## 2021-07-28 NOTE — DISCHARGE INSTR - COC
Continuity of Care Form    Patient Name: Annette Coleman   :  1965  MRN:  [id number]    Admit date:  2021  Discharge date: 2021    Code Status Order: [facility]   Advance Directives:      Admitting Physician:  Karina Grossman MD  PCP: Sierra Segovia MD    Discharging Nurse: [facility] Unit/Room#: H7G-8794/5137-15  Discharging Unit Phone Number: [phone]    Emergency Contact:   Extended Emergency Contact Information  Primary Emergency Contact: Ismael Gunter  Address: [address], [address] [place] [address] Phone: [phone]  Relation: Spouse  Secondary Emergency Contact: Shira Moseley9 Phone: [phone]  Mobile Phone: [phone]  Relation: Brother/Sister    Past Surgical History:  Past Surgical History:   Procedure Laterality Date    CRANIOTOMY Right 2020    RIGHT PARIETAL CRANIOTOMY FOR STEREOTACTIC BIOPSY OF BRAIN MASS performed by Jad Jha.  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[facility] [address]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.9    Acute metabolic encephalopathy J41.69       Isolation/Infection:   Isolation            No Isolation          Patient Infection Status       Infection Onset Added Last Indicated Last Indicated By Review Planned Expiration Resolved Resolved By    None active    Resolved    COVID-19 Rule Out 21 COVID-19 (Ordered)   21 Rule-Out Test Resulted    COVID-19 Rule Out 21 COVID-19, Rapid (Ordered) 07/27/21 Rule-Out Test Resulted            Nurse Assessment:  Last Vital Signs: BP (!) 124/94   Pulse 87   Temp 97.6 °F (36.4 °C) (Axillary)   Resp 20   Ht 5' 3\" (1.6 m)   Wt 216 lb 11.4 oz (98.3 kg)   LMP 06/25/2019 (Within Months)   SpO2 96%   BMI 38.39 kg/m²     Last documented pain score (0-10 scale): Pain Level: 0  Last Weight:   Wt Readings from Last 1 Encounters:   07/28/21 216 lb 11.4 oz (98.3 kg)     Mental Status:  oriented to self    IV Access:  - Peripheral IV - site  right hand, insertion date: July 7, 2021   - Peripheral IV - site right antecubital, insertion date: July 7, 2021    Nursing Mobility/ADLs:  Walking   Dependent  Transfer  Dependent  Bathing  Dependent  Dressing  Dependent  Toileting  Dependent  Feeding  Dependent  Med Admin  Dependent  Med Delivery   crushed and prefers mixed with apple sauce    Wound Care Documentation and Therapy:        Elimination:  Continence:   · Bowel: No  · Bladder: No  Urinary Catheter: Insertion Date: July 7, 2021 and Indication for Use of Catheter: Hospice/comfort/palliateive care and Need for fluid management in critally ill patients in a critical care setting not able to be managed by other means such as BSC with hat, bedpan, urinal, condom catheter, or short term intermittent urethral catheterization   Colostomy/Ileostomy/Ileal Conduit: No       Date of Last BM: July 28, 2021    Intake/Output Summary (Last 24 hours) at 7/28/2021 1316  Last data filed at 7/28/2021 1030  Gross per 24 hour   Intake 3062.68 ml   Output 1775 ml   Net 1287.68 ml     I/O last 3 completed shifts: In: 7914.8 [I.V.:6619; IV Piggyback:1295.8]  Out: 2925 [Urine:2925]    Safety Concerns:      At Risk for Falls, History of Seizures and Aspiration Risk    Impairments/Disabilities:      Vision    Nutrition Therapy:  Current Nutrition Therapy:   - Oral Diet:  Dysphagia 1 pureed    Routes of Feeding: Oral  Liquids: Honey Thick Liquids  Daily Fluid Restriction: no  Last Modified Barium Swallow with Video (Video Swallowing Test): not done    Treatments at the Time of Hospital Discharge:   Respiratory Treatments:  Oxygen Therapy:  is on oxygen at 4 L/min per nasal cannula. Ventilator:    - No ventilator support    Rehab Therapies:  Weight Bearing Status/Restrictions: No weight bearing restirctions  Other Medical Equipment (for information only, NOT a DME order):  wheelchair  Other Treatments:    Patient's personal belongings (please select all that are sent with patient):  Glasses, and glasses case    RN SIGNATURE:  Electronically signed by Sharon Ferrer RN on 7/28/21 at 4:00 PM EDT    CASE MANAGEMENT/SOCIAL WORK SECTION    Inpatient Status Date: ***    Readmission Risk Assessment Score:  Readmission Risk              Risk of Unplanned Readmission:  19           Discharging to Facility/ Agency   · Name:   · Address:  · Phone:  · Fax:    Dialysis Facility (if applicable)   · Name:  · Address:  · Dialysis Schedule:  · Phone:  · Fax:    / signature: {Esignature:[id number]:::0}    PHYSICIAN SECTION    Prognosis: Poor    Condition at Discharge: Terminal    Rehab Potential (if transferring to Rehab): Poor    Recommended Labs or Other Treatments After Discharge: Comfort care    Physician Certification: I certify the above information and transfer of Casey Harrell  is necessary for the continuing treatment of the diagnosis listed and that she requires Hospice for less 30 days.      Update Admission H&P: No change in H&P    PHYSICIAN SIGNATURE:  Electronically signed by Niyah Adams MD on 7/28/21 at 1:17 PM EDT

## 2021-07-28 NOTE — PROGRESS NOTES
Physical Therapy  Rec'd order for PT evaluation on 7/27, order cancelled by RN and hospice coordinator due to pt being admitted to hospice and PT eval no longer appropriate.   Electronically signed by Antonia English PT on 7/28/2021 at 1:35 PM

## 2021-07-28 NOTE — PLAN OF CARE
Problem: Falls - Risk of:  Goal: Will remain free from falls  Description: Will remain free from falls  7/27/2021 2325 by Edmar Yeung RN  Outcome: Ongoing     Problem: Skin Integrity:  Goal: Will show no infection signs and symptoms  Description: Will show no infection signs and symptoms  7/27/2021 2325 by Edmar Yeung RN  Outcome: Ongoing     Problem: Non-Violent Restraints  Goal: Removal from restraints as soon as assessed to be safe  7/27/2021 2325 by Edmar Yeung RN  Outcome: Ongoing     Problem: Non-Violent Restraints  Goal: Patient's dignity will be maintained  7/27/2021 2325 by Edmar Yeung RN  Outcome: Ongoing

## 2021-07-28 NOTE — PROGRESS NOTES
Occupational Therapy  Defer OT eval due to change in d/c and treatment plans.  Tiny Sandoval OTR/NARENDRA #[id number] 7/28/2021 1:34 PM

## 2021-07-28 NOTE — CARE COORDINATION
[facility] out of network with patient's insurance. [facility] referral made by Lucy FALLON.   Patient to discharge with [facility] inpatient unit at [facility] around 7:30pm via [facility] license of [facility].  Electronically signed by Shayla Lim RN Case Management [phone] on 7/28/2021 at 3:03 PM

## 2021-07-28 NOTE — DISCHARGE SUMMARY
Hospital Medicine Discharge Summary    Patient ID: Prince Westbrook      Patient's PCP: Marimar Gardiner MD    Admit Date: 7/27/2021     Discharge Date:   7/28/21    Admitting Physician: Yovani Mondragon MD     Discharge Physician: Yovani Mondragon MD     Discharge Diagnoses: Active Hospital Problems    Diagnosis Date Noted    Acute metabolic encephalopathy [Q86.03] 07/27/2021       The patient was seen and examined on day of discharge and this discharge summary is in conjunction with any daily progress note from day of discharge. Hospital Course: The patient is a 55 y.o. female who presents to [facility] with altered mental status. Patient has a history of glioblastoma status post chemoradiation along with adjuvant therapy. Patient overall has continued to decline over the past year or so. Her primary neurooncologist has recommended hospice and the patient's  is not mentally there yet. She is been residing in a nursing home and has been more altered and confused the past few days. She came to the emergency department was found to have an abnormal urinalysis along with multifocal pneumonia. Patient will be admitted to the hospital for further care and evaluation. Patient was seen by palliative care who spoke with the family and they decided to make the patient DNR comfort care. I then spoke with the family on the following day and the mother and  decided to enroll the patient in hospice, preferably inpatient hospice. All treatment was stopped for her sepsis, multifocal pneumonia and urinary tract infection. Patient was discharged to inpatient hospice with [facility].     Sepsis  Secondary to pneumonia along with urinary tract infection  Await culture and sensitivity  IV fluids  Continue broad-spectrum IV antibiotics  Blood and urine cultures     Multifocal pneumonia  Treat as HCAP in the setting of an immunocompromise patient  Rapid Covid negative, PCR added on  Blood and urine cultures  Broad-spectrum IV antibiotics     Urinary tract infection  Await culture and sensitivity  Continue broad-spectrum IV antibiotics     Acute metabolic encephalopathy  Secondary to sepsis along with glioblastoma  Continue to monitor     Glioblastoma  Status post chemoradiation  No further treatment offered  Recommend hospice      Exam:     BP (!) 124/94   Pulse 87   Temp 97.6 °F (36.4 °C) (Axillary)   Resp 20   Ht 5' 3\" (1.6 m)   Wt 216 lb 11.4 oz (98.3 kg)   LMP 06/25/2019 (Within Months)   SpO2 96%   BMI 38.39 kg/m²     General appearance: No apparent distress confused  HEENT Normal cephalic, atraumatic without obvious deformity. Pupils equal, round, and reactive to light. Extra ocular muscles intact. Conjunctivae/corneas clear. Neck: Supple, No jugular venous distention/bruits. Trachea midline without thyromegaly or adenopathy with full range of motion. Lungs: Diminished breath sounds bilateral  Heart: Regular rate and rhythm with Normal S1/S2  Abdomen: Soft, non-tender or non-distended without rigidity  Extremities: No clubbing, cyanosis, or edema bilaterally. Cold   skin: Skin color, texture, turgor normal.  No rashes or lesions. Neurologic: Alert but minimally responsive  Mental status: Alert but minimally responsive  Capillary Refill: Acceptable  < 3 seconds  Peripheral Pulses: +3 Easily felt, not easily obliterated with pressure    Consults:     IP CONSULT TO NEUROLOGY  IP CONSULT TO PALLIATIVE CARE  IP CONSULT TO HOSPICE    Significant Diagnostic Studies:     CT Head WO Contrast   Final Result   No acute intracranial abnormality. Multifocal partially calcified   intracranial malignancies which have significantly increased in size since   12/07/2020. Moderate cerebral edema and mass effect caused by the lesions. Lesion has been previously biopsied although the pathology is unclear from   the reports in EPIC.          XR CHEST PORTABLE   Final Result Multifocal pneumonia. Disposition: Inpatient hospice    Condition at Discharge: Terminal    Discharge Instructions/Follow-up: Hospice    Code Status:  DNR-CC     Activity: activity as tolerated    Diet: regular diet      Labs: For convenience and continuity at follow-up the following most recent labs are provided:      CBC:    Lab Results   Component Value Date    WBC 5.7 07/28/2021    HGB 10.5 07/28/2021    HCT 30.5 07/28/2021    PLT 48 07/28/2021       Renal:    Lab Results   Component Value Date     07/28/2021    K 3.5 07/28/2021     07/28/2021    CO2 23 07/28/2021    BUN 14 07/28/2021    CREATININE <0.5 07/28/2021    CALCIUM 7.5 07/28/2021    PHOS 2.1 07/28/2021       Discharge Medications:     Current Discharge Medication List           Details   dexamethasone (DECADRON) 4 MG tablet Take 4 mg by mouth daily      lacosamide (VIMPAT) 100 MG TABS tablet Take 200 mg by mouth 2 times daily. levETIRAcetam (KEPPRA) 500 MG tablet Take 1,000 mg by mouth 2 times daily              No future appointments. Time Spent on discharge is more than 30 minutes in the examination, evaluation, counseling and review of medications and discharge plan. Signed:    Niyah Adams MD   7/28/2021      Thank you Brien Brwon MD for the opportunity to be involved in this patient's care. If you have any questions or concerns please feel free to contact me at [phone].

## 2021-07-31 LAB
BLOOD CULTURE, ROUTINE: NORMAL
CULTURE, BLOOD 2: NORMAL

## (undated) DEVICE — SPONGE GZ W4XL4IN COT 12 PLY TYP VII WVN C FLD DSGN

## (undated) DEVICE — STAPLER SKIN H3.9MM WIRE DIA0.58MM CRWN 6.9MM 35 STPL ROT

## (undated) DEVICE — MAYFIELD® DISPOSABLE ADULT SKULL PIN (PLASTIC BASE): Brand: MAYFIELD®

## (undated) DEVICE — GLOVE SURG SZ 6 L12IN FNGR THK75MIL WHT LTX POLYMER BEAD

## (undated) DEVICE — PROTECTOR ULN NRV PUR FOAM HK LOOP STRP ANATOMICALLY

## (undated) DEVICE — GARMENT,MEDLINE,DVT,INT,CALF,MED, GEN2: Brand: MEDLINE

## (undated) DEVICE — SURGICAL SET UP - SURE SET: Brand: MEDLINE INDUSTRIES, INC.

## (undated) DEVICE — PRESSURE MONITORING SET: Brand: TRUWAVE, VAMP PLUS

## (undated) DEVICE — JEWISH HOSPITAL TURNOVER KIT: Brand: MEDLINE INDUSTRIES, INC.

## (undated) DEVICE — SUTURE MCRYL SZ 4-0 L27IN ABSRB UD L19MM PS-2 1/2 CIR PRIM Y426H

## (undated) DEVICE — TOOL F2/8TA23 LEGEND 8CM 2.3MM TAPER: Brand: MIDAS REX™

## (undated) DEVICE — MARKER,SKIN,WI/RULER AND LABELS: Brand: MEDLINE

## (undated) DEVICE — NEEDLE BX OD1.8MM L150MM CUT WIND L10MM MTL RUL SYR SYR

## (undated) DEVICE — PLATE ES AD W 9FT CRD 2

## (undated) DEVICE — PIN ADLT MAYFIELD RIGID MOLD FINGER

## (undated) DEVICE — SYRINGE MED 10ML TRNSLUC BRL PLUNG BLK MRK POLYPR CTRL

## (undated) DEVICE — ADAPTER LAB INTMED LUER 17GA

## (undated) DEVICE — GLOVE SURG SZ 75 L12IN FNGR THK94MIL TRNSLUC YEL LTX

## (undated) DEVICE — SUTURE NRLN SZ 4-0 L18IN NONABSORBABLE BLK L13MM TF 1/2 CIR C584D

## (undated) DEVICE — KIT CATHETER 20GA L12CM ART CUST

## (undated) DEVICE — BLADE CLIPPER SURG SENSICLIP

## (undated) DEVICE — CABLE BPLR L12FT FLYING LD DISPOSABLE

## (undated) DEVICE — BLANKET WRM W40.2XL55.9IN IORT LO BODY + MISTRAL AIR

## (undated) DEVICE — SUTURE VCRL SZ 3-0 L18IN ABSRB UD L26MM SH 1/2 CIR J864D

## (undated) DEVICE — 3L THIN WALL CAN: Brand: CRD

## (undated) DEVICE — CHLORAPREP 26ML ORANGE

## (undated) DEVICE — GLOVE SURG SZ 65 CRM LTX FREE POLYISOPRENE POLYMER BEAD ANTI

## (undated) DEVICE — SOLUTION IV 500ML 0.9% SOD CHL PH 5 INJ USP VIAFLX PLAS

## (undated) DEVICE — COVER,TABLE,HEAVY DUTY,77"X90",STRL: Brand: MEDLINE

## (undated) DEVICE — SUTURE VCRL SZ 2-0 L27IN ABSRB UD L26MM SH 1/2 CIR J417H

## (undated) DEVICE — SOLUTION IV 1000ML 0.9% SOD CHL

## (undated) DEVICE — DRESSING FOAM DISK DIA1IN H 7MM HYDRPHLC CHG IMPREG IN SL

## (undated) DEVICE — TOOL 10BA50-MN LEGEND 10CM 5MM BA: Brand: MIDAS REX™

## (undated) DEVICE — MARKER REFLECTIVE REFLECTIVE TWST ON SPHERZ 5PK

## (undated) DEVICE — SURE SET-DOUBLE BASIN-LF: Brand: MEDLINE INDUSTRIES, INC.

## (undated) DEVICE — TOWEL,OR,DSP,ST,BLUE,DLX,8/PK,10PK/CS: Brand: MEDLINE

## (undated) DEVICE — SUTURE ETHLN SZ 3-0 L18IN NONABSORBABLE BLK PS-2 L19MM 3/8 1669H

## (undated) DEVICE — UNDERGLOVE SURG SZ 8 BLU LTX FREE SYN POLYISOPRENE POLYMER

## (undated) DEVICE — PRECISION SPECIMEN CONTAINER 4 OZ (118 ML) • POSITIVE SEAL INDICATOR OR PACKAGED: Brand: PRECISION

## (undated) DEVICE — 3M™ IOBAN™ 2 ANTIMICROBIAL INCISE DRAPE 6640EZ: Brand: IOBAN™ 2

## (undated) DEVICE — 3 ML SYRINGE LUER-LOCK TIP: Brand: MONOJECT

## (undated) DEVICE — JEWISH CRANI PACK: Brand: MEDLINE INDUSTRIES, INC.

## (undated) DEVICE — INTENDED USE FOR SURGICAL MARKING ON INTACT SKIN, ALSO PROVIDES A PERMANENT METHOD OF IDENTIFYING OBJECTS IN THE OPERATING ROOM: Brand: WRITESITE® PLUS MINI PREP RESISTANT MARKER

## (undated) DEVICE — PRESSURE TUBING: Brand: TRUWAVE